# Patient Record
Sex: FEMALE | Race: WHITE | NOT HISPANIC OR LATINO | URBAN - METROPOLITAN AREA
[De-identification: names, ages, dates, MRNs, and addresses within clinical notes are randomized per-mention and may not be internally consistent; named-entity substitution may affect disease eponyms.]

---

## 2023-10-07 ENCOUNTER — INPATIENT (INPATIENT)
Facility: HOSPITAL | Age: 88
LOS: 8 days | Discharge: HOME | DRG: 644 | End: 2023-10-16
Attending: STUDENT IN AN ORGANIZED HEALTH CARE EDUCATION/TRAINING PROGRAM | Admitting: STUDENT IN AN ORGANIZED HEALTH CARE EDUCATION/TRAINING PROGRAM
Payer: MEDICARE

## 2023-10-07 VITALS
WEIGHT: 95.02 LBS | DIASTOLIC BLOOD PRESSURE: 70 MMHG | HEART RATE: 51 BPM | RESPIRATION RATE: 18 BRPM | SYSTOLIC BLOOD PRESSURE: 181 MMHG | OXYGEN SATURATION: 97 % | TEMPERATURE: 99 F

## 2023-10-07 DIAGNOSIS — R33.9 RETENTION OF URINE, UNSPECIFIED: ICD-10-CM

## 2023-10-07 DIAGNOSIS — M25.561 PAIN IN RIGHT KNEE: ICD-10-CM

## 2023-10-07 DIAGNOSIS — I10 ESSENTIAL (PRIMARY) HYPERTENSION: ICD-10-CM

## 2023-10-07 DIAGNOSIS — E78.5 HYPERLIPIDEMIA, UNSPECIFIED: ICD-10-CM

## 2023-10-07 DIAGNOSIS — R53.1 WEAKNESS: ICD-10-CM

## 2023-10-07 DIAGNOSIS — E03.9 HYPOTHYROIDISM, UNSPECIFIED: ICD-10-CM

## 2023-10-07 DIAGNOSIS — Z29.9 ENCOUNTER FOR PROPHYLACTIC MEASURES, UNSPECIFIED: ICD-10-CM

## 2023-10-07 DIAGNOSIS — Z79.899 OTHER LONG TERM (CURRENT) DRUG THERAPY: ICD-10-CM

## 2023-10-07 DIAGNOSIS — J45.909 UNSPECIFIED ASTHMA, UNCOMPLICATED: ICD-10-CM

## 2023-10-07 LAB
ALBUMIN SERPL ELPH-MCNC: 3.5 G/DL — SIGNIFICANT CHANGE UP (ref 3.3–5)
ALP SERPL-CCNC: 72 U/L — SIGNIFICANT CHANGE UP (ref 40–120)
ALT FLD-CCNC: 24 U/L — SIGNIFICANT CHANGE UP (ref 12–78)
ANION GAP SERPL CALC-SCNC: 8 MMOL/L — SIGNIFICANT CHANGE UP (ref 5–17)
APPEARANCE UR: CLEAR — SIGNIFICANT CHANGE UP
APTT BLD: 34.6 SEC — SIGNIFICANT CHANGE UP (ref 24.5–35.6)
AST SERPL-CCNC: 35 U/L — SIGNIFICANT CHANGE UP (ref 15–37)
BASOPHILS # BLD AUTO: 0.03 K/UL — SIGNIFICANT CHANGE UP (ref 0–0.2)
BASOPHILS NFR BLD AUTO: 0.4 % — SIGNIFICANT CHANGE UP (ref 0–2)
BILIRUB SERPL-MCNC: 0.7 MG/DL — SIGNIFICANT CHANGE UP (ref 0.2–1.2)
BILIRUB UR-MCNC: NEGATIVE — SIGNIFICANT CHANGE UP
BUN SERPL-MCNC: 17 MG/DL — SIGNIFICANT CHANGE UP (ref 7–23)
CALCIUM SERPL-MCNC: 9.1 MG/DL — SIGNIFICANT CHANGE UP (ref 8.5–10.1)
CHLORIDE SERPL-SCNC: 107 MMOL/L — SIGNIFICANT CHANGE UP (ref 96–108)
CO2 SERPL-SCNC: 24 MMOL/L — SIGNIFICANT CHANGE UP (ref 22–31)
COLOR SPEC: YELLOW — SIGNIFICANT CHANGE UP
CREAT SERPL-MCNC: 1.2 MG/DL — SIGNIFICANT CHANGE UP (ref 0.5–1.3)
DIFF PNL FLD: NEGATIVE — SIGNIFICANT CHANGE UP
EGFR: 41 ML/MIN/1.73M2 — LOW
EOSINOPHIL # BLD AUTO: 0.37 K/UL — SIGNIFICANT CHANGE UP (ref 0–0.5)
EOSINOPHIL NFR BLD AUTO: 4.7 % — SIGNIFICANT CHANGE UP (ref 0–6)
FLUAV AG NPH QL: SIGNIFICANT CHANGE UP
FLUBV AG NPH QL: SIGNIFICANT CHANGE UP
GLUCOSE SERPL-MCNC: 108 MG/DL — HIGH (ref 70–99)
GLUCOSE UR QL: NEGATIVE MG/DL — SIGNIFICANT CHANGE UP
HCT VFR BLD CALC: 37.8 % — SIGNIFICANT CHANGE UP (ref 34.5–45)
HGB BLD-MCNC: 12.8 G/DL — SIGNIFICANT CHANGE UP (ref 11.5–15.5)
IMM GRANULOCYTES NFR BLD AUTO: 0.4 % — SIGNIFICANT CHANGE UP (ref 0–0.9)
INR BLD: 1.11 RATIO — SIGNIFICANT CHANGE UP (ref 0.85–1.18)
KETONES UR-MCNC: NEGATIVE MG/DL — SIGNIFICANT CHANGE UP
LACTATE SERPL-SCNC: 0.9 MMOL/L — SIGNIFICANT CHANGE UP (ref 0.7–2)
LEUKOCYTE ESTERASE UR-ACNC: NEGATIVE — SIGNIFICANT CHANGE UP
LYMPHOCYTES # BLD AUTO: 0.79 K/UL — LOW (ref 1–3.3)
LYMPHOCYTES # BLD AUTO: 10 % — LOW (ref 13–44)
MAGNESIUM SERPL-MCNC: 2.5 MG/DL — SIGNIFICANT CHANGE UP (ref 1.6–2.6)
MCHC RBC-ENTMCNC: 33 PG — SIGNIFICANT CHANGE UP (ref 27–34)
MCHC RBC-ENTMCNC: 33.9 GM/DL — SIGNIFICANT CHANGE UP (ref 32–36)
MCV RBC AUTO: 97.4 FL — SIGNIFICANT CHANGE UP (ref 80–100)
MONOCYTES # BLD AUTO: 0.63 K/UL — SIGNIFICANT CHANGE UP (ref 0–0.9)
MONOCYTES NFR BLD AUTO: 8 % — SIGNIFICANT CHANGE UP (ref 2–14)
NEUTROPHILS # BLD AUTO: 6.07 K/UL — SIGNIFICANT CHANGE UP (ref 1.8–7.4)
NEUTROPHILS NFR BLD AUTO: 76.5 % — SIGNIFICANT CHANGE UP (ref 43–77)
NITRITE UR-MCNC: NEGATIVE — SIGNIFICANT CHANGE UP
NRBC # BLD: 0 /100 WBCS — SIGNIFICANT CHANGE UP (ref 0–0)
PH UR: 6.5 — SIGNIFICANT CHANGE UP (ref 5–8)
PLATELET # BLD AUTO: 224 K/UL — SIGNIFICANT CHANGE UP (ref 150–400)
POTASSIUM SERPL-MCNC: 4.1 MMOL/L — SIGNIFICANT CHANGE UP (ref 3.5–5.3)
POTASSIUM SERPL-SCNC: 4.1 MMOL/L — SIGNIFICANT CHANGE UP (ref 3.5–5.3)
PROT SERPL-MCNC: 7.4 G/DL — SIGNIFICANT CHANGE UP (ref 6–8.3)
PROT UR-MCNC: SIGNIFICANT CHANGE UP MG/DL
PROTHROM AB SERPL-ACNC: 13 SEC — SIGNIFICANT CHANGE UP (ref 9.5–13)
RBC # BLD: 3.88 M/UL — SIGNIFICANT CHANGE UP (ref 3.8–5.2)
RBC # FLD: 14.5 % — SIGNIFICANT CHANGE UP (ref 10.3–14.5)
RSV RNA NPH QL NAA+NON-PROBE: SIGNIFICANT CHANGE UP
SARS-COV-2 RNA SPEC QL NAA+PROBE: SIGNIFICANT CHANGE UP
SODIUM SERPL-SCNC: 139 MMOL/L — SIGNIFICANT CHANGE UP (ref 135–145)
SP GR SPEC: 1.01 — SIGNIFICANT CHANGE UP (ref 1–1.03)
TROPONIN I, HIGH SENSITIVITY RESULT: 6.5 NG/L — SIGNIFICANT CHANGE UP
TSH SERPL-MCNC: 129 UIU/ML — HIGH (ref 0.36–3.74)
UROBILINOGEN FLD QL: 0.2 MG/DL — SIGNIFICANT CHANGE UP (ref 0.2–1)
WBC # BLD: 7.92 K/UL — SIGNIFICANT CHANGE UP (ref 3.8–10.5)
WBC # FLD AUTO: 7.92 K/UL — SIGNIFICANT CHANGE UP (ref 3.8–10.5)

## 2023-10-07 PROCEDURE — 70450 CT HEAD/BRAIN W/O DYE: CPT | Mod: 26,MA

## 2023-10-07 PROCEDURE — 99285 EMERGENCY DEPT VISIT HI MDM: CPT | Mod: FS

## 2023-10-07 PROCEDURE — 71250 CT THORAX DX C-: CPT | Mod: 26,MA

## 2023-10-07 PROCEDURE — 93010 ELECTROCARDIOGRAM REPORT: CPT

## 2023-10-07 PROCEDURE — 74176 CT ABD & PELVIS W/O CONTRAST: CPT | Mod: 26,MA

## 2023-10-07 PROCEDURE — 71045 X-RAY EXAM CHEST 1 VIEW: CPT | Mod: 26

## 2023-10-07 PROCEDURE — 99223 1ST HOSP IP/OBS HIGH 75: CPT | Mod: GC

## 2023-10-07 RX ORDER — LEVOTHYROXINE SODIUM 125 MCG
88 TABLET ORAL DAILY
Refills: 0 | Status: DISCONTINUED | OUTPATIENT
Start: 2023-10-07 | End: 2023-10-16

## 2023-10-07 RX ORDER — ATORVASTATIN CALCIUM 80 MG/1
20 TABLET, FILM COATED ORAL AT BEDTIME
Refills: 0 | Status: ACTIVE | OUTPATIENT
Start: 2023-10-07 | End: 2024-09-04

## 2023-10-07 RX ORDER — LANOLIN ALCOHOL/MO/W.PET/CERES
3 CREAM (GRAM) TOPICAL AT BEDTIME
Refills: 0 | Status: DISCONTINUED | OUTPATIENT
Start: 2023-10-07 | End: 2023-10-16

## 2023-10-07 RX ORDER — CEFTRIAXONE 500 MG/1
1000 INJECTION, POWDER, FOR SOLUTION INTRAMUSCULAR; INTRAVENOUS ONCE
Refills: 0 | Status: DISCONTINUED | OUTPATIENT
Start: 2023-10-07 | End: 2023-10-07

## 2023-10-07 RX ORDER — ACETAMINOPHEN 500 MG
650 TABLET ORAL ONCE
Refills: 0 | Status: COMPLETED | OUTPATIENT
Start: 2023-10-07 | End: 2023-10-07

## 2023-10-07 RX ORDER — SENNA PLUS 8.6 MG/1
2 TABLET ORAL AT BEDTIME
Refills: 0 | Status: DISCONTINUED | OUTPATIENT
Start: 2023-10-07 | End: 2023-10-16

## 2023-10-07 RX ORDER — AMLODIPINE BESYLATE 2.5 MG/1
5 TABLET ORAL DAILY
Refills: 0 | Status: DISCONTINUED | OUTPATIENT
Start: 2023-10-07 | End: 2023-10-11

## 2023-10-07 RX ORDER — ZINC OXIDE 200 MG/G
1 OINTMENT TOPICAL
Refills: 0 | Status: DISCONTINUED | OUTPATIENT
Start: 2023-10-07 | End: 2023-10-16

## 2023-10-07 RX ORDER — ACETAMINOPHEN 500 MG
650 TABLET ORAL EVERY 6 HOURS
Refills: 0 | Status: DISCONTINUED | OUTPATIENT
Start: 2023-10-07 | End: 2023-10-09

## 2023-10-07 RX ORDER — SODIUM CHLORIDE 9 MG/ML
1000 INJECTION INTRAMUSCULAR; INTRAVENOUS; SUBCUTANEOUS ONCE
Refills: 0 | Status: COMPLETED | OUTPATIENT
Start: 2023-10-07 | End: 2023-10-07

## 2023-10-07 RX ORDER — LEVOTHYROXINE SODIUM 125 MCG
88 TABLET ORAL ONCE
Refills: 0 | Status: COMPLETED | OUTPATIENT
Start: 2023-10-07 | End: 2023-10-07

## 2023-10-07 RX ORDER — CARVEDILOL PHOSPHATE 80 MG/1
6.25 CAPSULE, EXTENDED RELEASE ORAL EVERY 12 HOURS
Refills: 0 | Status: DISCONTINUED | OUTPATIENT
Start: 2023-10-07 | End: 2023-10-11

## 2023-10-07 RX ORDER — POLYETHYLENE GLYCOL 3350 17 G/17G
17 POWDER, FOR SOLUTION ORAL
Refills: 0 | Status: DISCONTINUED | OUTPATIENT
Start: 2023-10-07 | End: 2023-10-16

## 2023-10-07 RX ADMIN — SODIUM CHLORIDE 1000 MILLILITER(S): 9 INJECTION INTRAMUSCULAR; INTRAVENOUS; SUBCUTANEOUS at 19:15

## 2023-10-07 RX ADMIN — SODIUM CHLORIDE 1000 MILLILITER(S): 9 INJECTION INTRAMUSCULAR; INTRAVENOUS; SUBCUTANEOUS at 17:45

## 2023-10-07 RX ADMIN — Medication 650 MILLIGRAM(S): at 23:56

## 2023-10-07 RX ADMIN — Medication 650 MILLIGRAM(S): at 22:24

## 2023-10-07 RX ADMIN — Medication 88 MICROGRAM(S): at 22:05

## 2023-10-07 NOTE — H&P ADULT - PROBLEM SELECTOR PLAN 2
TSH on admission 129  Patient usually takes levothyroxine 88 mcg but patient's niece reports patient hasn't taken it since Wesley 10/1  s/p IVP levothyroxine 88 mcg x1 in ED  - T3 and T4 pending, f/u results  - cont. home levothyroxine  - endocrine (Dr. Perlman) consulted TSH on admission 129  Patient usually takes levothyroxine 88 mcg but patient's niece reports patient hasn't taken it since [place] 10/1 or prior   s/p IVP levothyroxine 88 mcg x1 in ED  - T3 and T4 pending, f/u results  - will restart. home levothyroxine  - endocrine (Dr. Perlman) consulted

## 2023-10-07 NOTE — H&P ADULT - PROBLEM SELECTOR PLAN 1
- on admission  - constipation and urinary retention  - s/p 1L NS,  chase placement in ED  - endocrine (Dr. Perlman) consulted - on admission  - likely in the setting of uncontrolled hypothyroidism  - constipation and urinary retention  - s/p 1L NS,  chase placement in ED  - endocrine (Dr. Perlman) consulted - likely in the setting of uncontrolled hypothyroidism  - on admission  - constipation and urinary retention  - s/p 1L NS,  chase placement in ED  - endocrine (Dr. Perlman) consulted

## 2023-10-07 NOTE — H&P ADULT - NSHPREVIEWOFSYSTEMS_GEN_ALL_CORE
CONSTITUTIONAL: +weakness +fatigue +decreased appetite. Denies fever, chills.  HEENT: denies blurred vision, sore throat  SKIN: +vulvar redness  CARDIOVASCULAR: denies chest pain, chest pressure, palpitations  RESPIRATORY: denies shortness of breath, cough, sputum production  GASTROINTESTINAL: +constipation. Denies nausea, vomiting, diarrhea, abdominal pain, melena or hematochezia   GENITOURINARY: denies dysuria, discharge. +chronic incontinence  NEUROLOGICAL: denies numbness, headache, focal weakness  MUSCULOSKELETAL: denies new joint pain, muscle aches +chronic knee pain  HEMATOLOGIC: denies gross bleeding, bruising CONSTITUTIONAL: +weakness +fatigue +decreased appetite. Denies fever, chills.  HEENT: denies blurred vision, sore throat  SKIN: +vulvovaginal redness  CARDIOVASCULAR: denies chest pain, chest pressure, palpitations  RESPIRATORY: denies shortness of breath, cough, sputum production  GASTROINTESTINAL: +constipation. Denies nausea, vomiting, diarrhea, abdominal pain, melena or hematochezia   GENITOURINARY: denies dysuria, discharge. +chronic incontinence  NEUROLOGICAL: denies numbness, headache, focal weakness  MUSCULOSKELETAL: denies new joint pain, muscle aches +chronic knee pain  HEMATOLOGIC: denies gross bleeding, bruising

## 2023-10-07 NOTE — H&P ADULT - PROBLEM SELECTOR PLAN 5
chronic  - cont. home amlodipine (hold for SBP <110)  - cont. home carvedilol (hold for SBP <110 or HR <60)

## 2023-10-07 NOTE — ED PROVIDER NOTE - CADM POA CENTRAL LINE
Procedure(s): ENDOSCOPIC ULTRASOUND. total IV anesthesia Anesthesia Post Evaluation Patient location during evaluation: bedside Patient participation: complete - patient participated Level of consciousness: responsive to verbal stimuli Pain management: satisfactory to patient Airway patency: patent Anesthetic complications: no 
Cardiovascular status: hemodynamically stable Respiratory status: spontaneous ventilation Hydration status: stable Vitals Value Taken Time /63 6/19/2019  2:47 PM  
Temp 36.1 °C (97 °F) 6/19/2019  2:18 PM  
Pulse 89 6/19/2019  2:47 PM  
Resp 14 6/19/2019  2:47 PM  
SpO2 93 % 6/19/2019  2:47 PM  
 
 No

## 2023-10-07 NOTE — ED PROVIDER NOTE - DIFFERENTIAL DIAGNOSIS
Patient presenting to the emergency room with altered mental status.  Differential is broad includes infectious etiology versus neurologic etiology versus possible reaction to not taking her medications including worsening of her hypothyroidism.  Will obtain screening septic work-up TSH free T3-T4 cardiac enzymes check UA urine culture obtain viral panel obtain CT imaging of the head chest abdomen pelvis EKG and monitor.  Patient will likely require admission for further work-up and management.  Even if this was the result of his CVA patient would not be a tenecteplase or interventional candidate as her symptoms have been ongoing for 1 to 2 weeks and therefore she would clearly be outside the window for any intervention. Differential Diagnosis

## 2023-10-07 NOTE — H&P ADULT - HISTORY OF PRESENT ILLNESS
89yF PMHx of hypothyroidism, osteoarthritis, HLD, and HTN presenting with weakness since Sunday, Patient and patient's niece state the patient has been experiencing weakness, fatigue, confusion, and decreased appetite which has been progressing since Sunday. The patient also had difficulty standing and walking and was receiving help from a friend/neighbor. In addition, the patient had not taken most of her medications since Sunday, including levothyroxine. The only medication the patient consistently took since Sunday was carvedilol. Patient denies fever, chills, or dysuria. Patient's niece endorses that the patient had a few episodes of confusion and being "spacey" for the past few days. Patient's niece also states that she noticed vulvar/vaginal redness when changing the patient's diaper. Patient endorses chronic incontinence and knee pain. Patient also endorses constipation. Patient denies abdominal pain, nausea, vomiting, chest pain, and shortness of breath.    Of note, patient is hard of hearing.    IN THE ED  Vitals: /70 | HR 51  | RR 18  | Temp 98.8F | SpO2 97% on RA   S/p: 1L NS, IVP levothyroxine 88 MCG x1, chase placement  Labs: WBC 7.92| | eGFR 41 | Cr 1.2 BUN 17| UA negative for nitrite and leukocyte esterase  Imaging  < from: CT Abdomen and Pelvis No Cont (10.07.23 @ 18:11) >  IMPRESSION:    Moderate volume of stool distending the rectum, with rectal wall   thickening and perirectal fat infiltration, constellation of findings   suggestive of stercoral colitis.  Distended urinary bladder, may represent urinary retention.  Cholelithiasis.    < end of copied text >    < from: CT Head No Cont (10.07.23 @ 18:07) >  IMPRESSION:   Mild to moderate periventricular and deep white matter   ischemia. 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    Enlarged empty sella.  Opacification of the LEFT sphenoid   sinus with mucosal thickening consistent with chronic sinusitis.    < end of copied text >   89yF PMHx of hypothyroidism, osteoarthritis, HLD, and HTN presenting with weakness since Sunday, Patient and patient's niece state the patient has been experiencing weakness, fatigue, confusion, and decreased appetite which has been progressing since Sunday. The patient also had difficulty standing and walking and was receiving help from a friend/neighbor. In addition, the patient had not taken most of her medications since Sunday, including levothyroxine. The only medication the patient consistently took since Sunday was carvedilol. Patient denies fever, chills, or dysuria. Patient's niece endorses that the patient had a few episodes of confusion and being "spacey" for the past few days. Patient's niece also states that she noticed vulvar/vaginal redness when changing the patient's diaper. Patient endorses chronic incontinence and knee pain. Patient also endorses constipation. Patient denies abdominal pain, nausea, vomiting, chest pain, and shortness of breath.  Of note, patient is hard of hearing.    IN THE ED  Vitals: /70 | HR 51  | RR 18  | Temp 98.8F | SpO2 97% on RA   S/p: 1L NS, IVP levothyroxine 88 MCG x1, chase placement  Labs: WBC 7.92| | eGFR 41 | Cr 1.2 BUN 17| UA negative for nitrite and leukocyte esterase  Imaging  < from: CT Abdomen and Pelvis No Cont (10.07.23 @ 18:11) >  IMPRESSION:    Moderate volume of stool distending the rectum, with rectal wall   thickening and perirectal fat infiltration, constellation of findings   suggestive of stercoral colitis.  Distended urinary bladder, may represent urinary retention.  Cholelithiasis.    < end of copied text >    < from: CT Head No Cont (10.07.23 @ 18:07) >  IMPRESSION:   Mild to moderate periventricular and deep white matter   ischemia. 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    Enlarged empty sella.  Opacification of the LEFT sphenoid   sinus with mucosal thickening consistent with chronic sinusitis.    < end of copied text >

## 2023-10-07 NOTE — H&P ADULT - ASSESSMENT
89yF PMHx of hypothyroidism, osteoarthritis, HLD, and HTN presenting with weakness since Sunday, Patient and patient's niece state the patient has been experiencing weakness, fatigue, confusion, and decreased appetite which has been progressing since Sunday. The patient also had difficulty standing and walking and was receiving help from a friend/neighbor. In addition, the patient had not taken most of her medications since Sunday, including levothyroxine. The only medication the patient consistently took since Sunday was carvedilol. Patient denies fever, chills, or dysuria. Patient's niece endorses that the patient had a few episodes of confusion and being "spacey" for the past few days. Patient's niece also states that she noticed vulvar/vaginal redness when changing the patient's diaper. Patient endorses chronic incontinence and knee pain. Patient also endorses constipation. Patient denies abdominal pain, nausea, vomiting, chest pain, and shortness of breath.    Of note, patient is hard of hearing.    IN THE ED  Vitals: /70 | HR 51  | RR 18  | Temp 98.8F | SpO2 97% on RA   S/p: , rocephin x1  Labs: WBC 7.92| | eGFR 41 | Cr 1.2 BUN 17| UA negative for nitrite and leukocyte esterase  Imaging  < from: CT Abdomen and Pelvis No Cont (10.07.23 @ 18:11) >  IMPRESSION:    Moderate volume of stool distending the rectum, with rectal wall   thickening and perirectal fat infiltration, constellation of findings   suggestive of stercoral colitis.  Distended urinary bladder, may represent urinary retention.  Cholelithiasis.    < end of copied text >    < from: CT Head No Cont (10.07.23 @ 18:07) >  IMPRESSION:   Mild to moderate periventricular and deep white matter   ischemia. 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    Enlarged empty sella.  Opacification of the LEFT sphenoid   sinus with mucosal thickening consistent with chronic sinusitis.    < end of copied text >   89yF PMHx of hypothyroidism, osteoarthritis, HLD, and HTN presenting with weakness, fatigue, confusion, and decreased appetite which has been progressing since Sunday. Admitted for weakness, urinary retention, and hypothyroidism ( on admission).

## 2023-10-07 NOTE — ED ADULT NURSE NOTE - OBJECTIVE STATEMENT
89y/o female A&ox1, ambulatory at baseline, received in rm3a. as per family, pt has been unable to walk, weakness/decreased responsiveness since sunday. pt denies complaints, pain. pt awake, responding to verbal stimuli, respirations even and unlabored. sinus dominga 40s-50s on cardiac monitor. satting 96% on RA. 20g iv placed in LAC, labs sent. md at bedside for eval. bed in lowest position, side rails up, call bell in hand, safety maintained. awaiting further orders. will continue to monitor.

## 2023-10-07 NOTE — H&P ADULT - NSHPPHYSICALEXAM_GEN_ALL_CORE
T(C): 37 (10-07-23 @ 20:15), Max: 37.1 (10-07-23 @ 16:40)  HR: 45 (10-07-23 @ 20:15) (45 - 51)  BP: 177/74 (10-07-23 @ 20:15) (177/74 - 181/70)  RR: 16 (10-07-23 @ 20:15) (16 - 18)  SpO2: 97% (10-07-23 @ 20:15) (97% - 97%)    GENERAL: no acute distress, appropriately interactive, chase in place  HEENMT: sclera clear, moist mucous membranes +hard of hearing  NECK: supple, soft  LUNGS: good air entry bilaterally, clear to auscultation, symmetric breath sounds, no wheezing or rhonchi appreciated  HEART: soft S1/S2, regular rate and rhythm, no murmurs noted, no lower extremity edema  GASTROINTESTINAL: abdomen is soft, nontender, nondistended, normoactive bowel sounds  INTEGUMENT: good skin turgor, warm skin, appears well perfused  MUSCULOSKELETAL: no clubbing or cyanosis, +b/l knee edema R>L, nontender to palpation  NEUROLOGIC: interacts, follows commands, and answers questions appropriately  HEME/LYMPH: no obvious ecchymosis or petechiae T(C): 37 (10-07-23 @ 20:15), Max: 37.1 (10-07-23 @ 16:40)  HR: 45 (10-07-23 @ 20:15) (45 - 51)  BP: 177/74 (10-07-23 @ 20:15) (177/74 - 181/70)  RR: 16 (10-07-23 @ 20:15) (16 - 18)  SpO2: 97% (10-07-23 @ 20:15) (97% - 97%)  GENERAL: no acute distress, appropriately interactive, chase in place  HEENMT: sclera clear, moist mucous membranes +hard of hearing  NECK: supple, soft  LUNGS: good air entry bilaterally, clear to auscultation, symmetric breath sounds, no wheezing or rhonchi appreciated  HEART: soft S1/S2, regular rate and rhythm, no murmurs noted, no lower extremity edema  GASTROINTESTINAL: abdomen is soft, nontender, nondistended, normoactive bowel sounds  INTEGUMENT: good skin turgor, warm skin, appears well perfused  MUSCULOSKELETAL: no clubbing or cyanosis, +b/l knee edema R>L, nontender to palpation  NEUROLOGIC: interacts, follows commands, and answers questions appropriately  HEME/LYMPH: no obvious ecchymosis or petechiae

## 2023-10-07 NOTE — ED PROVIDER NOTE - CONSTITUTIONAL, MLM
Well appearing, awake, alert, oriented to person, not place, time/situation and in no apparent distress Kashia normal...

## 2023-10-07 NOTE — H&P ADULT - PROBLEM SELECTOR PLAN 8
Chronic knee pain likely secondary to OA  - bilateral knee edema (R>L) on exam  - 650mg tylenol x1  - PRN tylenol ordered for pain  - f/u bilateral knee x-ray Chronic knee pain likely secondary to OA  - bilateral knee edema (R>L) on exam  - 650mg tylenol x1  - PRN tylenol ordered for pain  - bilateral knee x-ray ordered. Patient currently refusing

## 2023-10-07 NOTE — H&P ADULT - PROBLEM SELECTOR PLAN 9
DVT ppx: subQ heparin DVT ppx: subQ heparin q8 - DVT ppx: subQ heparin q8  - Patient and niece interested in learning more about GOC but not interested in filling out a MOLST form at time of admission. Palliative consulted for GOC discussion

## 2023-10-07 NOTE — ED PROVIDER NOTE - CLINICAL SUMMARY MEDICAL DECISION MAKING FREE TEXT BOX
Patient is a 89-year-old female who presents to the emergency room with a chief complaint of weakness past medical history of hypertension hyperlipidemia hypothyroidism hearing loss asthma.  Presents to the emergency room with niece for generalized weakness and decline for about a week.  Niece reports that she had lived alone and was usually independent.  2 weeks ago family noted decline.  Patient's neighbor has been helping out feeding her but family decided to move the patient in with them.  Patient is not eating on her own and is not speaking and is unable to walk due to generalized weakness.  It is unclear if patient has been taking her medications but is currently refusing them now.  Family denies any known trauma reports today patient was just moaning and not speaking.  On exam patient is lying in bed awake not answering questions.  Normocephalic atraumatic pupils equal round and reactive heart regular bradycardic lungs clear to auscultation abdomen soft with diffuse tenderness to palpation what appears to be suprapubic fullness.  Patient presenting to the emergency room with altered mental status.  Differential is broad includes infectious etiology versus neurologic etiology versus possible reaction to not taking her medications including worsening of her hypothyroidism.  Will obtain screening septic work-up TSH free T3-T4 cardiac enzymes check UA urine culture obtain viral panel obtain CT imaging of the head chest abdomen pelvis EKG and monitor.  Patient will likely require admission for further work-up and management.  Even if this was the result of his CVA patient would not be a tenecteplase or interventional candidate as her symptoms have been ongoing for 1 to 2 weeks and therefore she would clearly be outside the window for any intervention.  Results of labs reviewed patient with a white count of 7 no significant electrolyte abnormality and TSH of 129 raising concern for uncontrolled hypothyroidism resulting in patient's bradycardia IV Synthroid was ordered.  Viral panel negative.  Independent review of chest x-ray reveals no acute infiltrate.  CT imaging reveals mild to moderate periventricular deep white matter ischemia old lacunar infarcts are seen.  Moderate volume of stool distending the rectum with rectal wall thickening suggestive of stercoral colitis.  Distended urinary bladder.  Cholelithiasis.  Urinary retention may be secondary to the uncontrolled hypothyroidism Krishna catheter placed urine sent no evidence of overt infection.  Patient will require admission for further work-up and management. Independent review of EKG reveals a sinus bradycardia with PVCs left axis deviation right bundle branch block at a rate of 49 beats per min

## 2023-10-07 NOTE — H&P ADULT - ATTENDING COMMENTS
89yF PMHx of hypothyroidism, osteoarthritis, HLD, and HTN presenting with weakness, fatigue, confusion, and decreased appetite which has been progressing since Sunday. Admitted for weakness, urinary retention, and hypothyroidism ( on admission).    Agree with above. Edited where appropriate

## 2023-10-07 NOTE — H&P ADULT - PROBLEM SELECTOR PLAN 3
CT A/P: Moderate volume of stool distending the rectum and distended urinary bladder  - UA negative for nitrite and leukocyte esterase  - urinary retention may be secondary to constipation  - desitin ordered for vulvovaginal redness  - chase placed CT A/P: Moderate volume of stool distending the rectum and distended urinary bladder  - UA negative for nitrite and leukocyte esterase  - urinary retention may be secondary to constipation  - dulcolax suppository x1  - bowel regimen (miralax and senna)  - desitin ordered for vulvovaginal redness  - chase placed

## 2023-10-07 NOTE — ED PROVIDER NOTE - PROGRESS NOTE DETAILS
tsh abnormal t3 t4 added on given synthroid ct scan + stool and distended bladder chase ordered   will admit for weakness possible thyroid related may need rehab/nursing placement

## 2023-10-07 NOTE — H&P ADULT - NSHPSOCIALHISTORY_GEN_ALL_CORE
Lives: alone in a condo  ADLs: handles most but needs some physical assistance. Ambulates with a cane  Diet: no shellfish (dental bridge recently broke so currently eating soups and puree)  Alcohol Use: none  Tobacco Use: never  Recreational Drug Use: none

## 2023-10-07 NOTE — H&P ADULT - PROBLEM SELECTOR PLAN 6
chronic  - patient uses albuterol, Advair, and Singulair but doses unknown  - primary team to clarify medication reconciliation

## 2023-10-08 DIAGNOSIS — Z98.890 OTHER SPECIFIED POSTPROCEDURAL STATES: Chronic | ICD-10-CM

## 2023-10-08 LAB
ALBUMIN SERPL ELPH-MCNC: 3.2 G/DL — LOW (ref 3.3–5)
ALP SERPL-CCNC: 66 U/L — SIGNIFICANT CHANGE UP (ref 40–120)
ALT FLD-CCNC: 23 U/L — SIGNIFICANT CHANGE UP (ref 12–78)
ANION GAP SERPL CALC-SCNC: 7 MMOL/L — SIGNIFICANT CHANGE UP (ref 5–17)
AST SERPL-CCNC: 31 U/L — SIGNIFICANT CHANGE UP (ref 15–37)
BASOPHILS # BLD AUTO: 0.03 K/UL — SIGNIFICANT CHANGE UP (ref 0–0.2)
BASOPHILS NFR BLD AUTO: 0.5 % — SIGNIFICANT CHANGE UP (ref 0–2)
BILIRUB SERPL-MCNC: 0.6 MG/DL — SIGNIFICANT CHANGE UP (ref 0.2–1.2)
BUN SERPL-MCNC: 15 MG/DL — SIGNIFICANT CHANGE UP (ref 7–23)
CALCIUM SERPL-MCNC: 8.7 MG/DL — SIGNIFICANT CHANGE UP (ref 8.5–10.1)
CHLORIDE SERPL-SCNC: 111 MMOL/L — HIGH (ref 96–108)
CO2 SERPL-SCNC: 25 MMOL/L — SIGNIFICANT CHANGE UP (ref 22–31)
CREAT SERPL-MCNC: 1.1 MG/DL — SIGNIFICANT CHANGE UP (ref 0.5–1.3)
EGFR: 45 ML/MIN/1.73M2 — LOW
EOSINOPHIL # BLD AUTO: 0.42 K/UL — SIGNIFICANT CHANGE UP (ref 0–0.5)
EOSINOPHIL NFR BLD AUTO: 6.6 % — HIGH (ref 0–6)
GLUCOSE SERPL-MCNC: 91 MG/DL — SIGNIFICANT CHANGE UP (ref 70–99)
HCT VFR BLD CALC: 33.5 % — LOW (ref 34.5–45)
HGB BLD-MCNC: 11.5 G/DL — SIGNIFICANT CHANGE UP (ref 11.5–15.5)
IMM GRANULOCYTES NFR BLD AUTO: 0.3 % — SIGNIFICANT CHANGE UP (ref 0–0.9)
LYMPHOCYTES # BLD AUTO: 0.86 K/UL — LOW (ref 1–3.3)
LYMPHOCYTES # BLD AUTO: 13.5 % — SIGNIFICANT CHANGE UP (ref 13–44)
MCHC RBC-ENTMCNC: 33 PG — SIGNIFICANT CHANGE UP (ref 27–34)
MCHC RBC-ENTMCNC: 34.3 GM/DL — SIGNIFICANT CHANGE UP (ref 32–36)
MCV RBC AUTO: 96.3 FL — SIGNIFICANT CHANGE UP (ref 80–100)
MONOCYTES # BLD AUTO: 0.54 K/UL — SIGNIFICANT CHANGE UP (ref 0–0.9)
MONOCYTES NFR BLD AUTO: 8.5 % — SIGNIFICANT CHANGE UP (ref 2–14)
NEUTROPHILS # BLD AUTO: 4.5 K/UL — SIGNIFICANT CHANGE UP (ref 1.8–7.4)
NEUTROPHILS NFR BLD AUTO: 70.6 % — SIGNIFICANT CHANGE UP (ref 43–77)
NRBC # BLD: 0 /100 WBCS — SIGNIFICANT CHANGE UP (ref 0–0)
PLATELET # BLD AUTO: 185 K/UL — SIGNIFICANT CHANGE UP (ref 150–400)
POTASSIUM SERPL-MCNC: 3.9 MMOL/L — SIGNIFICANT CHANGE UP (ref 3.5–5.3)
POTASSIUM SERPL-SCNC: 3.9 MMOL/L — SIGNIFICANT CHANGE UP (ref 3.5–5.3)
PROT SERPL-MCNC: 6.7 G/DL — SIGNIFICANT CHANGE UP (ref 6–8.3)
RBC # BLD: 3.48 M/UL — LOW (ref 3.8–5.2)
RBC # FLD: 14.4 % — SIGNIFICANT CHANGE UP (ref 10.3–14.5)
SODIUM SERPL-SCNC: 143 MMOL/L — SIGNIFICANT CHANGE UP (ref 135–145)
T3 SERPL-MCNC: <20 NG/DL — LOW (ref 80–200)
T4 AB SER-ACNC: 0.4 UG/DL — LOW (ref 4.6–12)
WBC # BLD: 6.37 K/UL — SIGNIFICANT CHANGE UP (ref 3.8–10.5)
WBC # FLD AUTO: 6.37 K/UL — SIGNIFICANT CHANGE UP (ref 3.8–10.5)

## 2023-10-08 PROCEDURE — 99232 SBSQ HOSP IP/OBS MODERATE 35: CPT

## 2023-10-08 RX ORDER — HEPARIN SODIUM 5000 [USP'U]/ML
5000 INJECTION INTRAVENOUS; SUBCUTANEOUS EVERY 8 HOURS
Refills: 0 | Status: DISCONTINUED | OUTPATIENT
Start: 2023-10-08 | End: 2023-10-16

## 2023-10-08 RX ORDER — INFLUENZA VIRUS VACCINE 15; 15; 15; 15 UG/.5ML; UG/.5ML; UG/.5ML; UG/.5ML
0.7 SUSPENSION INTRAMUSCULAR ONCE
Refills: 0 | Status: DISCONTINUED | OUTPATIENT
Start: 2023-10-08 | End: 2023-10-16

## 2023-10-08 RX ADMIN — ZINC OXIDE 1 APPLICATION(S): 200 OINTMENT TOPICAL at 20:53

## 2023-10-08 RX ADMIN — ATORVASTATIN CALCIUM 20 MILLIGRAM(S): 80 TABLET, FILM COATED ORAL at 21:41

## 2023-10-08 RX ADMIN — HEPARIN SODIUM 5000 UNIT(S): 5000 INJECTION INTRAVENOUS; SUBCUTANEOUS at 21:41

## 2023-10-08 RX ADMIN — SENNA PLUS 2 TABLET(S): 8.6 TABLET ORAL at 21:41

## 2023-10-08 RX ADMIN — Medication 88 MICROGRAM(S): at 06:55

## 2023-10-08 RX ADMIN — Medication 10 MILLIGRAM(S): at 07:02

## 2023-10-08 RX ADMIN — HEPARIN SODIUM 5000 UNIT(S): 5000 INJECTION INTRAVENOUS; SUBCUTANEOUS at 06:55

## 2023-10-08 RX ADMIN — Medication 650 MILLIGRAM(S): at 22:11

## 2023-10-08 RX ADMIN — HEPARIN SODIUM 5000 UNIT(S): 5000 INJECTION INTRAVENOUS; SUBCUTANEOUS at 14:12

## 2023-10-08 RX ADMIN — CARVEDILOL PHOSPHATE 6.25 MILLIGRAM(S): 80 CAPSULE, EXTENDED RELEASE ORAL at 17:19

## 2023-10-08 RX ADMIN — Medication 650 MILLIGRAM(S): at 21:41

## 2023-10-08 RX ADMIN — ZINC OXIDE 1 APPLICATION(S): 200 OINTMENT TOPICAL at 07:33

## 2023-10-08 RX ADMIN — POLYETHYLENE GLYCOL 3350 17 GRAM(S): 17 POWDER, FOR SOLUTION ORAL at 17:19

## 2023-10-08 RX ADMIN — AMLODIPINE BESYLATE 5 MILLIGRAM(S): 2.5 TABLET ORAL at 06:55

## 2023-10-08 RX ADMIN — POLYETHYLENE GLYCOL 3350 17 GRAM(S): 17 POWDER, FOR SOLUTION ORAL at 06:54

## 2023-10-08 NOTE — PROGRESS NOTE ADULT - PROBLEM SELECTOR PLAN 9
- DVT ppx: subQ heparin q8  - Patient and niece interested in learning more about GOC but not interested in filling out a MOLST form at time of admission. Palliative consulted for GOC discussion

## 2023-10-08 NOTE — ED ADULT NURSE REASSESSMENT NOTE - NSFALLHARMRISKINTERV_ED_ALL_ED

## 2023-10-08 NOTE — PROGRESS NOTE ADULT - PROBLEM SELECTOR PLAN 1
- likely in the setting of uncontrolled hypothyroidism  - on admission  - constipation and urinary retention  - s/p 1L NS,  chase placement in ED  - endocrine (Dr. Perlman) consulted  -SLP consulted

## 2023-10-08 NOTE — PROGRESS NOTE ADULT - PROBLEM SELECTOR PLAN 6
chronic  - Continue Albuterol PRN Chronic knee pain likely secondary to OA  - bilateral knee edema (R>L) on exam  - 650mg tylenol x1  - PRN tylenol ordered for pain  - bilateral knee x-ray ordered. Patient currently refusing

## 2023-10-08 NOTE — PATIENT PROFILE ADULT - FALL HARM RISK - HARM RISK INTERVENTIONS

## 2023-10-08 NOTE — PROGRESS NOTE ADULT - PROBLEM SELECTOR PLAN 2
TSH on admission 129  Patient usually takes levothyroxine 88 mcg but patient's niece reports patient hasn't taken it since [place] 10/1 or prior   s/p IVP levothyroxine 88 mcg x1 in ED  - T3 and T4 pending, f/u results  - will restart. home levothyroxine  - endocrine (Dr. Perlman) consulted

## 2023-10-08 NOTE — ED ADULT NURSE REASSESSMENT NOTE - NS ED NURSE REASSESS COMMENT FT1
0715: report received from previous shift RN. pt laying in bed, awake and restful. both hearing aid & dentures were broken at home per daughter at bedside-- pt hard of hearing + puree diet, crushed pills per orders.  pt & daughter deny any new/worsening complaints. pt bradycardic on CM, HR 40's since ED arrival, asymptomatic, received synthroid this AM for hypothyroidism. pt remote telemetry admission for weakness & abnormal TSH... respirations even/unlabored, no acute distress noted. comfort/safety maintained. will continue to monitor.

## 2023-10-08 NOTE — PATIENT PROFILE ADULT - FUNCTIONAL ASSESSMENT - BASIC MOBILITY 6.
2-calculated by average/Not able to assess (calculate score using [facility] averaging method)

## 2023-10-08 NOTE — PROGRESS NOTE ADULT - PROBLEM SELECTOR PLAN 8
Chronic knee pain likely secondary to OA  - bilateral knee edema (R>L) on exam  - 650mg tylenol x1  - PRN tylenol ordered for pain  - bilateral knee x-ray ordered. Patient currently refusing

## 2023-10-08 NOTE — PROGRESS NOTE ADULT - ASSESSMENT
89yF PMHx of hypothyroidism, osteoarthritis, HLD, and HTN presenting with weakness, fatigue, confusion, and decreased appetite which has been progressing since Sunday. Admitted for weakness, urinary retention, and hypothyroidism ( on admission).

## 2023-10-09 LAB
ANION GAP SERPL CALC-SCNC: 7 MMOL/L — SIGNIFICANT CHANGE UP (ref 5–17)
BUN SERPL-MCNC: 16 MG/DL — SIGNIFICANT CHANGE UP (ref 7–23)
CALCIUM SERPL-MCNC: 8.5 MG/DL — SIGNIFICANT CHANGE UP (ref 8.5–10.1)
CHLORIDE SERPL-SCNC: 110 MMOL/L — HIGH (ref 96–108)
CO2 SERPL-SCNC: 25 MMOL/L — SIGNIFICANT CHANGE UP (ref 22–31)
CREAT SERPL-MCNC: 1.1 MG/DL — SIGNIFICANT CHANGE UP (ref 0.5–1.3)
CULTURE RESULTS: NO GROWTH — SIGNIFICANT CHANGE UP
EGFR: 45 ML/MIN/1.73M2 — LOW
GLUCOSE SERPL-MCNC: 89 MG/DL — SIGNIFICANT CHANGE UP (ref 70–99)
HCT VFR BLD CALC: 33.8 % — LOW (ref 34.5–45)
HGB BLD-MCNC: 12.1 G/DL — SIGNIFICANT CHANGE UP (ref 11.5–15.5)
MCHC RBC-ENTMCNC: 33.9 PG — SIGNIFICANT CHANGE UP (ref 27–34)
MCHC RBC-ENTMCNC: 35.8 GM/DL — SIGNIFICANT CHANGE UP (ref 32–36)
MCV RBC AUTO: 94.7 FL — SIGNIFICANT CHANGE UP (ref 80–100)
NRBC # BLD: 0 /100 WBCS — SIGNIFICANT CHANGE UP (ref 0–0)
PLATELET # BLD AUTO: 173 K/UL — SIGNIFICANT CHANGE UP (ref 150–400)
POTASSIUM SERPL-MCNC: 3.5 MMOL/L — SIGNIFICANT CHANGE UP (ref 3.5–5.3)
POTASSIUM SERPL-SCNC: 3.5 MMOL/L — SIGNIFICANT CHANGE UP (ref 3.5–5.3)
RBC # BLD: 3.57 M/UL — LOW (ref 3.8–5.2)
RBC # FLD: 14.1 % — SIGNIFICANT CHANGE UP (ref 10.3–14.5)
SODIUM SERPL-SCNC: 142 MMOL/L — SIGNIFICANT CHANGE UP (ref 135–145)
SPECIMEN SOURCE: SIGNIFICANT CHANGE UP
WBC # BLD: 6.94 K/UL — SIGNIFICANT CHANGE UP (ref 3.8–10.5)
WBC # FLD AUTO: 6.94 K/UL — SIGNIFICANT CHANGE UP (ref 3.8–10.5)

## 2023-10-09 PROCEDURE — 99232 SBSQ HOSP IP/OBS MODERATE 35: CPT

## 2023-10-09 RX ORDER — ACETAMINOPHEN 500 MG
650 TABLET ORAL ONCE
Refills: 0 | Status: COMPLETED | OUTPATIENT
Start: 2023-10-09 | End: 2023-10-09

## 2023-10-09 RX ORDER — IBUPROFEN 200 MG
200 TABLET ORAL ONCE
Refills: 0 | Status: COMPLETED | OUTPATIENT
Start: 2023-10-09 | End: 2023-10-09

## 2023-10-09 RX ORDER — ACETAMINOPHEN 500 MG
650 TABLET ORAL EVERY 6 HOURS
Refills: 0 | Status: DISCONTINUED | OUTPATIENT
Start: 2023-10-10 | End: 2023-10-10

## 2023-10-09 RX ADMIN — AMLODIPINE BESYLATE 5 MILLIGRAM(S): 2.5 TABLET ORAL at 05:35

## 2023-10-09 RX ADMIN — Medication 260 MILLIGRAM(S): at 13:54

## 2023-10-09 RX ADMIN — HEPARIN SODIUM 5000 UNIT(S): 5000 INJECTION INTRAVENOUS; SUBCUTANEOUS at 22:35

## 2023-10-09 RX ADMIN — HEPARIN SODIUM 5000 UNIT(S): 5000 INJECTION INTRAVENOUS; SUBCUTANEOUS at 05:34

## 2023-10-09 RX ADMIN — SENNA PLUS 2 TABLET(S): 8.6 TABLET ORAL at 22:35

## 2023-10-09 RX ADMIN — HEPARIN SODIUM 5000 UNIT(S): 5000 INJECTION INTRAVENOUS; SUBCUTANEOUS at 13:54

## 2023-10-09 RX ADMIN — Medication 650 MILLIGRAM(S): at 23:16

## 2023-10-09 RX ADMIN — ATORVASTATIN CALCIUM 20 MILLIGRAM(S): 80 TABLET, FILM COATED ORAL at 22:35

## 2023-10-09 RX ADMIN — Medication 200 MILLIGRAM(S): at 20:18

## 2023-10-09 RX ADMIN — Medication 650 MILLIGRAM(S): at 14:35

## 2023-10-09 RX ADMIN — POLYETHYLENE GLYCOL 3350 17 GRAM(S): 17 POWDER, FOR SOLUTION ORAL at 05:34

## 2023-10-09 RX ADMIN — POLYETHYLENE GLYCOL 3350 17 GRAM(S): 17 POWDER, FOR SOLUTION ORAL at 18:46

## 2023-10-09 RX ADMIN — CARVEDILOL PHOSPHATE 6.25 MILLIGRAM(S): 80 CAPSULE, EXTENDED RELEASE ORAL at 18:47

## 2023-10-09 RX ADMIN — ZINC OXIDE 1 APPLICATION(S): 200 OINTMENT TOPICAL at 05:34

## 2023-10-09 RX ADMIN — Medication 260 MILLIGRAM(S): at 22:46

## 2023-10-09 RX ADMIN — ZINC OXIDE 1 APPLICATION(S): 200 OINTMENT TOPICAL at 18:46

## 2023-10-09 RX ADMIN — Medication 88 MICROGRAM(S): at 05:35

## 2023-10-09 RX ADMIN — Medication 200 MILLIGRAM(S): at 19:48

## 2023-10-09 NOTE — GOALS OF CARE CONVERSATION - ADVANCED CARE PLANNING - CONVERSATION DETAILS
Writer met with pt daughter Reviewed patient's medical and social history as well as events leading to patient's hospitalization. Writer discussed patient's current diagnosis  medical condition and management,  weakness, impaired mental status, advanced age, prognosis, and life expectancy. Inquired about patient's wishes regarding extent of medical care to be provided including escalation of medical care into the ICU intubation with vent  and use of vasopressor support. In addition, the writer inquired about thoughts regarding cardiopulmonary resuscitation, artificial nutrition and hydration including use of feeding tubes and IVF, antibiotics, and further investigative studies such as blood draws and radiology .pt daughter showed good . insight into medical condition. All questions answered.  Patient daughter consented to DNR/DNI  status. MOLST form filled out and placed in chart.

## 2023-10-09 NOTE — DISCHARGE NOTE PROVIDER - NSDCCPCAREPLAN_GEN_ALL_CORE_FT
PRINCIPAL DISCHARGE DIAGNOSIS  Diagnosis: Weakness  Assessment and Plan of Treatment:       SECONDARY DISCHARGE DIAGNOSES  Diagnosis: Abnormal TSH  Assessment and Plan of Treatment:      PRINCIPAL DISCHARGE DIAGNOSIS  Diagnosis: Weakness  Assessment and Plan of Treatment: You symptoms have improved.      SECONDARY DISCHARGE DIAGNOSES  Diagnosis: Abnormal TSH  Assessment and Plan of Treatment: You were found to have abnormal levels of thyroid function hormone. We started you on Levothyroxine 88 mcg daily. Please take your medication as prescribed. Follow with your primary medical doctor to monitor your thyroid function, repeat thyroid function tests in 4 weeks.     PRINCIPAL DISCHARGE DIAGNOSIS  Diagnosis: Weakness  Assessment and Plan of Treatment: You were admitted with weakness likely related to your thyroid function. We started you on medications for your thyroid funciton. Your symptoms have improved. Please follow with your medical doctor.      SECONDARY DISCHARGE DIAGNOSES  Diagnosis: Abnormal TSH  Assessment and Plan of Treatment: You were found to have abnormal levels of thyroid function hormone. We started you on Levothyroxine 88 mcg daily. Please take your medication as prescribed. Follow with your primary medical doctor to monitor your thyroid function, repeat thyroid function tests in 4 weeks.    Diagnosis: Urinary retention  Assessment and Plan of Treatment: You were seen by urology for urinary retention. You had a urinary catheter placed and disconitnued. You continued to retain urine. We had to place the urinary catheter and you are discharged with catheter and outpatient urology follow up. Plese make an appointment for follow up.    Diagnosis: HTN (hypertension)  Assessment and Plan of Treatment: Continue current medical management.     PRINCIPAL DISCHARGE DIAGNOSIS  Diagnosis: Weakness  Assessment and Plan of Treatment: You were admitted with weakness likely related to your thyroid function. We started you on medications for your thyroid funciton. Your symptoms have improved. Please follow with your medical doctor.      SECONDARY DISCHARGE DIAGNOSES  Diagnosis: Urinary retention  Assessment and Plan of Treatment: You were seen by urology for urinary retention. You had a urinary catheter placed and disconitnued. You continued to retain urine. We had to place the urinary catheter and you are discharged with catheter and outpatient urology follow up. Flush chase catheter with 10 cc normal saline PRN    Diagnosis: Abnormal TSH  Assessment and Plan of Treatment: You were found to have abnormal levels of thyroid function hormone. We started you on Levothyroxine 88 mcg daily. Please take your medication as prescribed. Follow with your primary medical doctor to monitor your thyroid function, repeat thyroid function tests in 4 weeks.    Diagnosis: HTN (hypertension)  Assessment and Plan of Treatment: Continue current medical management.

## 2023-10-09 NOTE — DISCHARGE NOTE PROVIDER - NSDCMRMEDTOKEN_GEN_ALL_CORE_FT
amLODIPine 5 mg oral tablet: 1 tab(s) orally once a day  atorvastatin 20 mg oral tablet: 1 tab(s) orally once a day  carvedilol 6.25 mg oral tablet: 1 tab(s) orally 2 times a day  Synthroid 88 mcg (0.088 mg) oral tablet: 1 tab(s) orally once a day   acetaminophen 325 mg oral tablet: 2 tab(s) orally every 6 hours As needed Moderate Pain (4 - 6)  amLODIPine 10 mg oral tablet: 1 tab(s) orally once a day  atorvastatin 20 mg oral tablet: 1 tab(s) orally once a day  carvedilol 6.25 mg oral tablet: 1 tab(s) orally 2 times a day  ibuprofen 400 mg oral tablet: 1 tab(s) orally every 8 hours As needed Mild Pain (1 - 3)  levothyroxine 88 mcg (0.088 mg) oral tablet: 1 tab(s) orally once a day  polyethylene glycol 3350 oral powder for reconstitution: 17 gram(s) orally 2 times a day  senna leaf extract oral tablet: 2 tab(s) orally once a day (at bedtime)   acetaminophen 325 mg oral tablet: 2 tab(s) orally every 6 hours As needed Moderate Pain (4 - 6)  amLODIPine 10 mg oral tablet: 1 tab(s) orally once a day  bethanechol 25 mg oral tablet: 1 tab(s) orally 2 times a day MDD: 50mg  hydrALAZINE 50 mg oral tablet: 1 tab(s) orally 3 times a day  ibuprofen 400 mg oral tablet: 1 tab(s) orally every 8 hours As needed Mild Pain (1 - 3)  levothyroxine 88 mcg (0.088 mg) oral tablet: 1 tab(s) orally once a day  polyethylene glycol 3350 oral powder for reconstitution: 17 gram(s) orally 2 times a day  senna leaf extract oral tablet: 2 tab(s) orally once a day (at bedtime)  traMADol 50 mg oral tablet: 1 tab(s) orally 3 times a day as needed for Severe Pain (7 - 10) MDD: 150 mg

## 2023-10-09 NOTE — PROGRESS NOTE ADULT - PROBLEM SELECTOR PLAN 1
- likely in the setting of uncontrolled hypothyroidism  - on admission  - constipation and urinary retention  - s/p 1L NS,  chase placement in ED  - endocrine (Dr. Perlman) consulted, recommendartions appreciated   -SLP consulted  -PT evaluation - daughter would like patient to go home   -Palliative care consulted - interested in DNR/DNI, limited interventions

## 2023-10-09 NOTE — CARE COORDINATION ASSESSMENT. - NSPASTMEDSURGHISTORY_GEN_ALL_CORE_FT
PAST MEDICAL & SURGICAL HISTORY:  Asthma      HTN (hypertension)      HLD (hyperlipidemia)      Hypothyroid      H/O removal of cyst

## 2023-10-09 NOTE — DISCHARGE NOTE PROVIDER - CARE PROVIDER_API CALL
MELECIO SMALL  Phone: [phone]  Fax: [phone]  Follow Up Time:     ARI BARBA  Follow Up Time:

## 2023-10-09 NOTE — PROGRESS NOTE ADULT - PROBLEM SELECTOR PLAN 6
Chronic knee pain likely secondary to OA  - bilateral knee edema (R>L) on exam  -Switch Tylenol to standing. Dose of IV now and at 9pm  - bilateral knee x-ray ordered. Daughter declining would like limited medical intervention

## 2023-10-09 NOTE — DISCHARGE NOTE PROVIDER - HOSPITAL COURSE
HPI:  89yF PMHx of hypothyroidism, osteoarthritis, HLD, and HTN presenting with weakness since Sunday, Patient and patient's niece state the patient has been experiencing weakness, fatigue, confusion, and decreased appetite which has been progressing since Sunday. The patient also had difficulty standing and walking and was receiving help from a friend/neighbor. In addition, the patient had not taken most of her medications since Sunday, including levothyroxine. The only medication the patient consistently took since Sunday was carvedilol. Patient denies fever, chills, or dysuria. Patient's niece endorses that the patient had a few episodes of confusion and being "spacey" for the past few days. Patient's niece also states that she noticed vulvar/vaginal redness when changing the patient's diaper. Patient endorses chronic incontinence and knee pain. Patient also endorses constipation. Patient denies abdominal pain, nausea, vomiting, chest pain, and shortness of breath.  Of note, patient is hard of hearing.    IN THE ED  Vitals: /70 | HR 51  | RR 18  | Temp 98.8F | SpO2 97% on RA   S/p: 1L NS, IVP levothyroxine 88 MCG x1, chase placement  Labs: WBC 7.92| | eGFR 41 | Cr 1.2 BUN 17| UA negative for nitrite and leukocyte esterase  Imaging  < from: CT Abdomen and Pelvis No Cont (10.07.23 @ 18:11) >  IMPRESSION:    Moderate volume of stool distending the rectum, with rectal wall   thickening and perirectal fat infiltration, constellation of findings   suggestive of stercoral colitis.  Distended urinary bladder, may represent urinary retention.  Cholelithiasis.    < end of copied text >    < from: CT Head No Cont (10.07.23 @ 18:07) >  IMPRESSION:   Mild to moderate periventricular and deep white matter   ischemia. 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    Enlarged empty sella.  Opacification of the LEFT sphenoid   sinus with mucosal thickening consistent with chronic sinusitis.    < end of copied text >   (07 Oct 2023 22:23)      ---  HOSPITAL COURSE: Patient admitted to medicine floor for management of     Pt seen and examined on day of discharge. Patient is medically optimized for discharge to home with close outpatient followup.    PHYSICAL EXAM ON DAY OF DISCHARGE:        ---  CONSULTANTS:     ---  TIME SPENT:  I, the attending physician, was physically present for the key portions of the evaluation and management (E/M) service provided. The total amount of time spent reviewing the hospital notes, laboratory values, imaging findings, assessing/counseling the patient, discussing with consultant physicians, social work, nursing staff was -- minutes    ---  Primary care provider was made aware of plan for discharge:      [  ] NO     [  ] YES   HPI:  89yF PMHx of hypothyroidism, osteoarthritis, HLD, and HTN presenting with weakness since Sunday, Patient and patient's niece state the patient has been experiencing weakness, fatigue, confusion, and decreased appetite which has been progressing since Sunday. The patient also had difficulty standing and walking and was receiving help from a friend/neighbor. In addition, the patient had not taken most of her medications since Sunday, including levothyroxine. The only medication the patient consistently took since Sunday was carvedilol. Patient denies fever, chills, or dysuria. Patient's niece endorses that the patient had a few episodes of confusion and being "spacey" for the past few days. Patient's niece also states that she noticed vulvar/vaginal redness when changing the patient's diaper. Patient endorses chronic incontinence and knee pain. Patient also endorses constipation. Patient denies abdominal pain, nausea, vomiting, chest pain, and shortness of breath.  Of note, patient is hard of hearing.    IN THE ED  Vitals: /70 | HR 51  | RR 18  | Temp 98.8F | SpO2 97% on RA   S/p: 1L NS, IVP levothyroxine 88 MCG x1, chase placement  Labs: WBC 7.92| | eGFR 41 | Cr 1.2 BUN 17| UA negative for nitrite and leukocyte esterase  Imaging  < from: CT Abdomen and Pelvis No Cont (10.07.23 @ 18:11) >  IMPRESSION:    Moderate volume of stool distending the rectum, with rectal wall   thickening and perirectal fat infiltration, constellation of findings   suggestive of stercoral colitis.  Distended urinary bladder, may represent urinary retention.  Cholelithiasis.    < end of copied text >    < from: CT Head No Cont (10.07.23 @ 18:07) >  IMPRESSION:   Mild to moderate periventricular and deep white matter   ischemia. 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    Enlarged empty sella.  Opacification of the LEFT sphenoid   sinus with mucosal thickening consistent with chronic sinusitis.    < end of copied text >   (07 Oct 2023 22:23)      ---  HOSPITAL COURSE: Patient admitted to medicine floor for management of weakness, urinary retention. Patient was seen by urology who recommended TOV as outpatient, d/c with Chase. Found with hypothyroidism on admit, started on levothyroxine 88 mcg, follow up in 4 weeks with PCP.     Pt seen and examined on day of discharge. Patient is medically optimized for discharge to home with close outpatient followup.    PHYSICAL EXAM ON DAY OF DISCHARGE:        ---  CONSULTANTS:     ---  TIME SPENT:  I, the attending physician, was physically present for the key portions of the evaluation and management (E/M) service provided. The total amount of time spent reviewing the hospital notes, laboratory values, imaging findings, assessing/counseling the patient, discussing with consultant physicians, social work, nursing staff was -- minutes    ---  Primary care provider was made aware of plan for discharge:      [  ] NO     [  ] YES   HPI:  89yF PMHx of hypothyroidism, osteoarthritis, HLD, and HTN presenting with weakness since Sunday, Patient and patient's niece state the patient has been experiencing weakness, fatigue, confusion, and decreased appetite which has been progressing since Sunday. The patient also had difficulty standing and walking and was receiving help from a friend/neighbor. In addition, the patient had not taken most of her medications since Sunday, including levothyroxine. The only medication the patient consistently took since Sunday was carvedilol. Patient denies fever, chills, or dysuria. Patient's niece endorses that the patient had a few episodes of confusion and being "spacey" for the past few days. Patient's niece also states that she noticed vulvar/vaginal redness when changing the patient's diaper. Patient endorses chronic incontinence and knee pain. Patient also endorses constipation. Patient denies abdominal pain, nausea, vomiting, chest pain, and shortness of breath.  Of note, patient is hard of hearing.    IN THE ED  Vitals: /70 | HR 51  | RR 18  | Temp 98.8F | SpO2 97% on RA   S/p: 1L NS, IVP levothyroxine 88 MCG x1, chase placement  Labs: WBC 7.92| | eGFR 41 | Cr 1.2 BUN 17| UA negative for nitrite and leukocyte esterase  Imaging  < from: CT Abdomen and Pelvis No Cont (10.07.23 @ 18:11) >  IMPRESSION:    Moderate volume of stool distending the rectum, with rectal wall   thickening and perirectal fat infiltration, constellation of findings   suggestive of stercoral colitis.  Distended urinary bladder, may represent urinary retention.  Cholelithiasis.    < end of copied text >    < from: CT Head No Cont (10.07.23 @ 18:07) >  IMPRESSION:   Mild to moderate periventricular and deep white matter   ischemia. 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    Enlarged empty sella.  Opacification of the LEFT sphenoid   sinus with mucosal thickening consistent with chronic sinusitis.    < end of copied text >   (07 Oct 2023 22:23)      ---  HOSPITAL COURSE: Patient admitted to medicine floor for management of weakness, urinary retention. Patient was seen by urology who recommended TOV as outpatient, d/c with Chase. Found with hypothyroidism on admit, started on levothyroxine 88 mcg, follow up in 4 weeks with PCP.     Pt seen and examined on day of discharge. Patient is medically optimized for discharge to home with close outpatient followup.    PHYSICAL EXAM ON DAY OF DISCHARGE:        ---  CONSULTANTS:     ---  TIME SPENT:  I, the attending physician, was physically present for the key portions of the evaluation and management (E/M) service provided. The total amount of time spent reviewing the hospital notes, laboratory values, imaging findings, assessing/counseling the patient, discussing with consultant physicians, social work, nursing staff was 35 minutes     HPI:  89yF PMHx of hypothyroidism, osteoarthritis, HLD, and HTN presenting with weakness since Sunday, Patient and patient's niece state the patient has been experiencing weakness, fatigue, confusion, and decreased appetite which has been progressing since Sunday. The patient also had difficulty standing and walking and was receiving help from a friend/neighbor. In addition, the patient had not taken most of her medications since Sunday, including levothyroxine. The only medication the patient consistently took since Sunday was carvedilol. Patient denies fever, chills, or dysuria. Patient's niece endorses that the patient had a few episodes of confusion and being "spacey" for the past few days. Patient's niece also states that she noticed vulvar/vaginal redness when changing the patient's diaper. Patient endorses chronic incontinence and knee pain. Patient also endorses constipation. Patient denies abdominal pain, nausea, vomiting, chest pain, and shortness of breath.  Of note, patient is hard of hearing.    IN THE ED  Vitals: /70 | HR 51  | RR 18  | Temp 98.8F | SpO2 97% on RA   S/p: 1L NS, IVP levothyroxine 88 MCG x1, chase placement  Labs: WBC 7.92| | eGFR 41 | Cr 1.2 BUN 17| UA negative for nitrite and leukocyte esterase  Imaging  < from: CT Abdomen and Pelvis No Cont (10.07.23 @ 18:11) >  IMPRESSION:    Moderate volume of stool distending the rectum, with rectal wall   thickening and perirectal fat infiltration, constellation of findings   suggestive of stercoral colitis.  Distended urinary bladder, may represent urinary retention.  Cholelithiasis.    < end of copied text >    < from: CT Head No Cont (10.07.23 @ 18:07) >  IMPRESSION:   Mild to moderate periventricular and deep white matter   ischemia. 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    Enlarged empty sella.  Opacification of the LEFT sphenoid   sinus with mucosal thickening consistent with chronic sinusitis.    < end of copied text >   (07 Oct 2023 22:23)      ---  HOSPITAL COURSE: Patient admitted to medicine floor for management of weakness, urinary retention. Patient was seen by urology who recommended TOV as outpatient, d/c with Chase. Found with hypothyroidism on admit, started on levothyroxine 88 mcg, follow up in 4 weeks with PCP.     Pt seen and examined on day of discharge. Patient is medically optimized for discharge to home with close outpatient followup.    PHYSICAL EXAM ON DAY OF DISCHARGE:  T(C): 36.9 (10-16-23 @ 04:47), Max: 36.9 (10-15-23 @ 20:48)  HR: 75 (10-16-23 @ 04:47) (74 - 90)  BP: 142/57 (10-16-23 @ 04:47) (127/52 - 142/57)  RR: 17 (10-16-23 @ 04:47) (17 - 17)  SpO2: 93% (10-16-23 @ 04:47) (92% - 94%)    GENERAL: patient appears well, no acute distress, appropriate, pleasant  EYES: sclera clear, no exudates  ENMT: oropharynx clear without erythema, no exudates, moist mucous membranes  NECK: supple, soft, no thyromegaly noted  LUNGS: good air entry bilaterally, clear to auscultation  HEART: soft S1/S2, regular rate and rhythm, no murmurs noted, no lower extremity edema  GASTROINTESTINAL: abdomen is soft, nontender, nondistended, normoactive bowel sounds, no palpable masses  INTEGUMENT: good skin turgor, no lesions noted  MUSCULOSKELETAL: no clubbing or cyanosis, no obvious deformity  NEUROLOGIC: awake, alert      ---  CONSULTANTS:     ---  TIME SPENT:  I, the attending physician, was physically present for the key portions of the evaluation and management (E/M) service provided. The total amount of time spent reviewing the hospital notes, laboratory values, imaging findings, assessing/counseling the patient, discussing with consultant physicians, social work, nursing staff was 35 minutes

## 2023-10-09 NOTE — CARE COORDINATION ASSESSMENT. - OTHER PERTINENT DISCHARGE PLANNING INFORMATION:
Met with patient and niece at bedside to discuss the role of case management with verbalized understanding.  Needs unclear at present.  Patient [facility] moved from [facility] into [facility] home.  Patient has no PCP in area (Discussed [facility].)  THey are currently not interested in HA, physical therapy services.  They are requesting a hospital bed for D/C.  Will remain available from a case management perspective.

## 2023-10-09 NOTE — CARE COORDINATION ASSESSMENT. - NS SW HOME EQUIPMENT
Per Niece patient is non ambulatory.  THey are requesting a hospital bed for d/c/cane/walker/wheelchair

## 2023-10-09 NOTE — CARE COORDINATION ASSESSMENT. - PRO ARRIVE FROM
Recently moved from [facility] into [facility] home.  [address] [facility].  This has occurred within the last week/home

## 2023-10-09 NOTE — CARE COORDINATION ASSESSMENT. - NSCAREPROVIDERS_GEN_ALL_CORE_FT
CARE PROVIDERS:  Accepting Physician: Rika Vance  Administration: Jean Maher  Administration: John Zuñiga  Administration: Kye Islsa  Administration: Vazquez Castillo  Administration: Annabel Carrillo  Admitting: Rika Vance  Attending: Rika Vance  Case Management: Tremaine Dietz  Consultant: Perlman, Craig  Consultant: Weil, Patricia ED ACP: Ladarius Mg  ED Attending: Patty Hobbs ED Nurse: Luis Avila  Nurse: Gloria Borrego  Nurse: Gladys Flannery  Nurse: Jody Bartlett  Ordered: ADM, User  Ordered: ServiceAccount, SCMMLM  PCA/Nursing Assistant: Latasha Obrien  PCA/Nursing Assistant: Juju Basilio  Physical Therapy: Jaclyn Noe  Primary Team: Rika Vance  Primary Team: Patience Hensley  Primary Team: Leatha Landry  Primary Team: Anjali Thacker  Registered Dietitian: Evelin Dave  : Prema Tolentino  : Ludivina Hawkins  Speech Pathology: Shama Dwyer  Team: [facility], Team

## 2023-10-10 LAB — TSH SERPL-MCNC: 110 UIU/ML — HIGH (ref 0.36–3.74)

## 2023-10-10 PROCEDURE — 99232 SBSQ HOSP IP/OBS MODERATE 35: CPT

## 2023-10-10 RX ORDER — ACETAMINOPHEN 500 MG
650 TABLET ORAL ONCE
Refills: 0 | Status: COMPLETED | OUTPATIENT
Start: 2023-10-10 | End: 2023-10-10

## 2023-10-10 RX ORDER — IBUPROFEN 200 MG
400 TABLET ORAL EVERY 8 HOURS
Refills: 0 | Status: DISCONTINUED | OUTPATIENT
Start: 2023-10-10 | End: 2023-10-16

## 2023-10-10 RX ADMIN — Medication 650 MILLIGRAM(S): at 03:58

## 2023-10-10 RX ADMIN — Medication 650 MILLIGRAM(S): at 03:28

## 2023-10-10 RX ADMIN — HEPARIN SODIUM 5000 UNIT(S): 5000 INJECTION INTRAVENOUS; SUBCUTANEOUS at 06:41

## 2023-10-10 RX ADMIN — Medication 400 MILLIGRAM(S): at 12:45

## 2023-10-10 RX ADMIN — Medication 88 MICROGRAM(S): at 06:41

## 2023-10-10 RX ADMIN — ZINC OXIDE 1 APPLICATION(S): 200 OINTMENT TOPICAL at 17:47

## 2023-10-10 RX ADMIN — AMLODIPINE BESYLATE 5 MILLIGRAM(S): 2.5 TABLET ORAL at 06:41

## 2023-10-10 RX ADMIN — HEPARIN SODIUM 5000 UNIT(S): 5000 INJECTION INTRAVENOUS; SUBCUTANEOUS at 14:50

## 2023-10-10 RX ADMIN — CARVEDILOL PHOSPHATE 6.25 MILLIGRAM(S): 80 CAPSULE, EXTENDED RELEASE ORAL at 17:46

## 2023-10-10 RX ADMIN — ZINC OXIDE 1 APPLICATION(S): 200 OINTMENT TOPICAL at 06:40

## 2023-10-10 RX ADMIN — Medication 400 MILLIGRAM(S): at 11:58

## 2023-10-10 RX ADMIN — Medication 650 MILLIGRAM(S): at 18:56

## 2023-10-10 NOTE — SWALLOW BEDSIDE ASSESSMENT ADULT - COMMENTS
Hospitalist note 10/10 "89yF PMHx of hypothyroidism, osteoarthritis, HLD, and HTN presenting with weakness, fatigue, confusion, and decreased appetite which has been progressing since Sunday. Admitted for weakness, urinary retention, and hypothyroidism ( on admission)."    CXR 10/7 "No acute finding."    Pt seen at bedside, awake/alert, during clinical swallow evaluation this AM. Pt's niece present at bedside who reported pt consumes "minced foods" but has had reduced appetite recently. Pt able to follow simple directions and answer simple yes/no questions.

## 2023-10-10 NOTE — PROGRESS NOTE ADULT - PROBLEM SELECTOR PLAN 7
- DVT ppx: subQ heparin q8
- DVT ppx: subQ heparin q8
- DVT ppx: subQ heparin q8  - Patient and niece interested in learning more about GOC but not interested in filling out a MOLST form at time of admission. Palliative consulted for GOC discussion

## 2023-10-10 NOTE — PROGRESS NOTE ADULT - PROBLEM SELECTOR PLAN 4
chronic  - cont. home atorvastatin

## 2023-10-10 NOTE — CHART NOTE - NSCHARTNOTEFT_GEN_A_CORE
Based on patient's ongoing issues with dysphasia ICD R13.10 patient will require a semi elctric hospital bed. Thisis a necessary to achieve positioning and elevation not able to be obtained in an ordinary bed. Bed pillows and wedges have been tried and ruled out. The patient will require a gel overlay to prevent pressure ulcers. Based on patient's ongoing issues with dysphasia, risk of aspiration  ICD R13.10 patient requires frequent and immediate repositioning changes that are not feasible in a regular bed with pillows or wedges. Patient needs head of bed elevated more than 30 degrees most of the time due to aspiration risk. Patient needs gel overlay to prevent skin breakdown, decubitus ulcer.

## 2023-10-10 NOTE — PROGRESS NOTE ADULT - PROBLEM SELECTOR PLAN 1
- likely in the setting of uncontrolled hypothyroidism  - on admission  - constipation and urinary retention  - s/p 1L NS,  chase placement in ED  - endocrine (Dr. Perlman) consulted, recommendations appreciated   -SLP consulted  -PT evaluation - daughter would like patient to go home   -Palliative care consulted - interested in DNR/DNI, limited interventions Likely in the setting of uncontrolled hypothyroidism  - on admission  - constipation and urinary retention  - s/p 1L NS,  chase placement in ED  - endocrine (Dr. Perlman) consulted, recommendations appreciated   -SLP consulted  -PT evaluation - daughter would like patient to go home   -Palliative care consulted - interested in DNR/DNI, limited interventions    Patient with chronic dementia at baseline, worsening requiring niece to drive from [place] where patient resides alone to bring her to [place] to live with her. Patient forgot to take her medications for several weeks / ?months. Patient with poor PO intake unless reminded to eat. Patient mainly bedbound. Unable to care for herself. Hospice eval requested.

## 2023-10-10 NOTE — PROGRESS NOTE ADULT - PROBLEM SELECTOR PLAN 3
CT A/P: Moderate volume of stool distending the rectum and distended urinary bladder  - UA negative for nitrite and leukocyte esterase  - urinary retention may be secondary to constipation  - dulcolax suppository x1  - bowel regimen (miralax and senna)  - desitin ordered for vulvovaginal redness  - chase placed
CT A/P: Moderate volume of stool distending the rectum and distended urinary bladder  - UA negative for nitrite and leukocyte esterase  - urinary retention may be secondary to constipation  - dulcolax suppository x1  - bowel regimen (miralax and senna)  - desitin ordered for vulvovaginal redness  - chase placed - DC chase. TOV 10/9
CT A/P: Moderate volume of stool distending the rectum and distended urinary bladder  - UA negative for nitrite and leukocyte esterase  - urinary retention may be secondary to constipation  - dulcolax suppository x1  - bowel regimen (miralax and senna)  - desitin ordered for vulvovaginal redness  - chase placed - DC chase. TOV 10/9

## 2023-10-10 NOTE — PROGRESS NOTE ADULT - PROBLEM SELECTOR PLAN 6
Chronic knee pain likely secondary to OA  - bilateral knee edema (R>L) on exam  -Switch Tylenol to standing  - bilateral knee x-ray ordered. Daughter declining would like limited medical intervention

## 2023-10-10 NOTE — PROGRESS NOTE ADULT - PROBLEM SELECTOR PLAN 2
TSH on admission 129  Patient usually takes levothyroxine 88 mcg but patient's niece reports patient hasn't taken it since [place] 10/1 or prior   s/p IVP levothyroxine 88 mcg x1 in ED  - T3 and T4 performed   - will restart. home levothyroxine  - endocrine (Dr. Perlman) consulted recommendations appreciated

## 2023-10-10 NOTE — SOCIAL WORK PROGRESS NOTE - NSSWPROGRESSNOTE_GEN_ALL_CORE
FLORIN met with this pts niece to discuss DC. Plan for dc home with home hospice, shraddha reviewed [facility]- referral sent. FLORIN to follow.

## 2023-10-10 NOTE — SWALLOW BEDSIDE ASSESSMENT ADULT - SWALLOW EVAL: DIAGNOSIS
1) Mild oral dysphagia for minced and moist solids, puree, moderately thick, mildly thick, and thin liquids marked by reduced utensil stripping, prolonged manipulation and reduced coordination resulting in delayed posterior transfer/transit. Mild lingual stasis noted which was cleared with liquid wash. 2) Functional pharyngeal stage of swallow for minced and moist, puree, moderately thick, mildly thick, and thin liquids marked by initiation of pharyngeal swallow trigger and hyolaryngeal excursion noted upon digital palpation. No overt signs/symptoms of penetration/aspiration noted.

## 2023-10-10 NOTE — SOCIAL WORK PROGRESS NOTE - NSSWPROGRESSNOTE_GEN_ALL_CORE
FLORIN spoke with pts niece at bedside- pt denied by hospice at tthis time, MD spoke with hospice MD. Plan remains for pt to return home when ready for DC. FLORIN to follow.

## 2023-10-11 LAB
ANION GAP SERPL CALC-SCNC: 6 MMOL/L — SIGNIFICANT CHANGE UP (ref 5–17)
BUN SERPL-MCNC: 16 MG/DL — SIGNIFICANT CHANGE UP (ref 7–23)
CALCIUM SERPL-MCNC: 8.6 MG/DL — SIGNIFICANT CHANGE UP (ref 8.5–10.1)
CHLORIDE SERPL-SCNC: 109 MMOL/L — HIGH (ref 96–108)
CO2 SERPL-SCNC: 26 MMOL/L — SIGNIFICANT CHANGE UP (ref 22–31)
CREAT SERPL-MCNC: 1.2 MG/DL — SIGNIFICANT CHANGE UP (ref 0.5–1.3)
EGFR: 41 ML/MIN/1.73M2 — LOW
GLUCOSE SERPL-MCNC: 148 MG/DL — HIGH (ref 70–99)
HCT VFR BLD CALC: 33.7 % — LOW (ref 34.5–45)
HGB BLD-MCNC: 11.6 G/DL — SIGNIFICANT CHANGE UP (ref 11.5–15.5)
MCHC RBC-ENTMCNC: 33.5 PG — SIGNIFICANT CHANGE UP (ref 27–34)
MCHC RBC-ENTMCNC: 34.4 GM/DL — SIGNIFICANT CHANGE UP (ref 32–36)
MCV RBC AUTO: 97.4 FL — SIGNIFICANT CHANGE UP (ref 80–100)
NRBC # BLD: 0 /100 WBCS — SIGNIFICANT CHANGE UP (ref 0–0)
PLATELET # BLD AUTO: 194 K/UL — SIGNIFICANT CHANGE UP (ref 150–400)
POTASSIUM SERPL-MCNC: 3.8 MMOL/L — SIGNIFICANT CHANGE UP (ref 3.5–5.3)
POTASSIUM SERPL-SCNC: 3.8 MMOL/L — SIGNIFICANT CHANGE UP (ref 3.5–5.3)
RBC # BLD: 3.46 M/UL — LOW (ref 3.8–5.2)
RBC # FLD: 14.8 % — HIGH (ref 10.3–14.5)
SODIUM SERPL-SCNC: 141 MMOL/L — SIGNIFICANT CHANGE UP (ref 135–145)
WBC # BLD: 7.19 K/UL — SIGNIFICANT CHANGE UP (ref 3.8–10.5)
WBC # FLD AUTO: 7.19 K/UL — SIGNIFICANT CHANGE UP (ref 3.8–10.5)

## 2023-10-11 PROCEDURE — 99232 SBSQ HOSP IP/OBS MODERATE 35: CPT

## 2023-10-11 RX ORDER — AMLODIPINE BESYLATE 2.5 MG/1
10 TABLET ORAL DAILY
Refills: 0 | Status: DISCONTINUED | OUTPATIENT
Start: 2023-10-11 | End: 2023-10-13

## 2023-10-11 RX ORDER — CARVEDILOL PHOSPHATE 80 MG/1
12.5 CAPSULE, EXTENDED RELEASE ORAL
Refills: 0 | Status: DISCONTINUED | OUTPATIENT
Start: 2023-10-11 | End: 2023-10-11

## 2023-10-11 RX ORDER — ACETAMINOPHEN 500 MG
650 TABLET ORAL ONCE
Refills: 0 | Status: COMPLETED | OUTPATIENT
Start: 2023-10-11 | End: 2023-10-11

## 2023-10-11 RX ADMIN — Medication 400 MILLIGRAM(S): at 14:45

## 2023-10-11 RX ADMIN — ZINC OXIDE 1 APPLICATION(S): 200 OINTMENT TOPICAL at 05:10

## 2023-10-11 RX ADMIN — HEPARIN SODIUM 5000 UNIT(S): 5000 INJECTION INTRAVENOUS; SUBCUTANEOUS at 21:35

## 2023-10-11 RX ADMIN — AMLODIPINE BESYLATE 10 MILLIGRAM(S): 2.5 TABLET ORAL at 18:40

## 2023-10-11 RX ADMIN — Medication 400 MILLIGRAM(S): at 05:07

## 2023-10-11 RX ADMIN — Medication 88 MICROGRAM(S): at 05:08

## 2023-10-11 RX ADMIN — Medication 650 MILLIGRAM(S): at 06:43

## 2023-10-11 RX ADMIN — Medication 650 MILLIGRAM(S): at 21:35

## 2023-10-11 RX ADMIN — AMLODIPINE BESYLATE 5 MILLIGRAM(S): 2.5 TABLET ORAL at 05:08

## 2023-10-11 RX ADMIN — HEPARIN SODIUM 5000 UNIT(S): 5000 INJECTION INTRAVENOUS; SUBCUTANEOUS at 14:45

## 2023-10-11 RX ADMIN — HEPARIN SODIUM 5000 UNIT(S): 5000 INJECTION INTRAVENOUS; SUBCUTANEOUS at 05:08

## 2023-10-11 RX ADMIN — ATORVASTATIN CALCIUM 20 MILLIGRAM(S): 80 TABLET, FILM COATED ORAL at 21:35

## 2023-10-11 RX ADMIN — Medication 650 MILLIGRAM(S): at 22:05

## 2023-10-11 RX ADMIN — Medication 400 MILLIGRAM(S): at 05:37

## 2023-10-11 NOTE — PROGRESS NOTE ADULT - ASSESSMENT
89F with hypothyroidism, osteoarthritis, HLD, and HTN admitted for generalized weakness, confusion and poor oral intake found to have severe hypothyroidism  ( on admission) without myxedema coma     1. Hypothyroidism   Without symptoms of myxedema coma   Started on synthroid 88mcg   Repeat TFTs in 4 weeks     2. Acute urinary retention   Likely due to hypothyroidism itself and resultant constipation  Chase catheter placed on presentation, chase removed, voiding    3. Constipation   Miralax, senna, PT, OOB    4. HLD  Continue statin although no appreciate long term benefit at this age  Will talk to family regarding dc non essential eds     -PT evaluation - daughter would like patient to go home   -Palliative care consulted - interested in DNR/DNI, limited interventions     5. HTN   BP elevated   increase amlodipine to 10mg nad coreg to 12.5mg BID     6. Advance care planning  DNR/DNI   Does not qualify for hospice given no terminal diagnosis  Plan for dismissal home with niece tentatively tomorrow   Discussed with family   89F with hypothyroidism, osteoarthritis, HLD, and HTN admitted for generalized weakness, confusion and poor oral intake found to have severe hypothyroidism  ( on admission) without myxedema coma     1. Hypothyroidism   Without symptoms of myxedema coma   Started on synthroid 88mcg   Repeat TFTs in 4 weeks     2. Acute urinary retention   Likely due to hypothyroidism itself and resultant constipation  Chase catheter placed on presentation, chase removed, voiding    3. Constipation   Miralax, senna, PT, OOB    4. HLD  Continue statin although no appreciate long term benefit at this age  Will talk to family regarding dc non essential eds     -PT evaluation - daughter would like patient to go home   -Palliative care consulted - interested in DNR/DNI, limited interventions     5. HTN   BP elevated   Increase amlodipine to 10mg and stop coreg given HR ranging 40-60bpm  Will monitor BP and adjust regimen accordingly      6. Advance care planning  DNR/DNI   Does not qualify for hospice given no terminal diagnosis  Plan for dismissal home with niece tentatively tomorrow   Discussed with family   89F with hypothyroidism, osteoarthritis, HLD, and HTN admitted for generalized weakness, confusion and poor oral intake found to have severe hypothyroidism  ( on admission) without myxedema coma     1. Hypothyroidism   Without symptoms of myxedema coma   Started on synthroid 88mcg   Repeat TFTs in 4 weeks     2. Acute urinary retention   Likely due to hypothyroidism itself and resultant constipation  Chase catheter placed on presentation, chase removed, voiding    3. Constipation   Miralax, senna, PT, OOB    4. HLD  Continue statin although no appreciate long term benefit at this age  Will talk to family regarding dc non essential eds     -PT evaluation - daughter would like patient to go home   -Palliative care consulted - interested in DNR/DNI, limited interventions     5. HTN   BP elevated   Increase amlodipine to 10mg and stop coreg given HR ranging 40-50bpm  Will monitor BP and adjust regimen accordingly      6. Advance care planning  DNR/DNI   Does not qualify for hospice given no terminal diagnosis  Plan for dismissal home with niece tentatively tomorrow   Discussed with family   89F with hypothyroidism, osteoarthritis, HLD, and HTN admitted for generalized weakness, confusion and poor oral intake found to have severe hypothyroidism  ( on admission) without myxedema coma     1. Hypothyroidism   Without symptoms of myxedema coma   Started on synthroid 88mcg   Repeat TFTs in 4 weeks     2. Acute urinary retention   Likely due to hypothyroidism itself and resultant constipation  Krishna removed, voiding    3. Constipation   Miralax, senna, PT, OOB    4. HLD  Continue statin although no appreciate long term benefit at this age  Will talk to family regarding dc non essential eds     -PT evaluation - daughter would like patient to go home   -Palliative care consulted - interested in DNR/DNI, limited interventions     5. HTN   BP elevated   Increase amlodipine to 10mg and stop coreg given HR ranging 40-50bpm  Will monitor BP and adjust regimen accordingly      6. Advance care planning  DNR/DNI   Does not qualify for hospice given no terminal diagnosis  Plan for dismissal home with niece tentatively tomorrow   Discussed with family

## 2023-10-11 NOTE — DISCHARGE NOTE NURSING/CASE MANAGEMENT/SOCIAL WORK - PATIENT PORTAL LINK FT
You can access the FollowMyHealth Patient Portal offered by [facility] by registering at the following website: [email or web address]. By joining [employer]’s FollowMyHealth portal, you will also be able to view your health information using other applications (apps) compatible with our system.

## 2023-10-11 NOTE — PATIENT CHOICE NOTE. - NSPTCHOICESTATE_GEN_ALL_CORE
I have met with the patient and/or caregiver to discuss discharge goals and treatment plan. Patient and/or caregiver also provided with instructions on accessing the CMS Compare websites for additional information related to Post Acute Provider quality and resource use measures to assist them in evaluation of the providers and in selecting their post-acute provider of choice. Patient and caregiver were informed of the facilities that are owned and/or operated by [facility]. I have discussed with the patient the availability of in-network facilities and providers. Patient and caregiver provided with a list of post-acute providers whose services are appropriate to the discharge plans and patient needs.     For patient requiring durable medical equipment, patient and/or caregiver were informed that they have the right to request who provides the required equipment.
I have met with the patient and/or caregiver to discuss discharge goals and treatment plan. Patient and/or caregiver also provided with instructions on accessing the CMS Compare websites for additional information related to Post Acute Provider quality and resource use measures to assist them in evaluation of the providers and in selecting their post-acute provider of choice. Patient and caregiver were informed of the facilities that are owned and/or operated by [facility]. I have discussed with the patient the availability of in-network facilities and providers. Patient and caregiver provided with a list of post-acute providers whose services are appropriate to the discharge plans and patient needs.     For patient requiring durable medical equipment, patient and/or caregiver were informed that they have the right to request who provides the required equipment.

## 2023-10-11 NOTE — DISCHARGE NOTE NURSING/CASE MANAGEMENT/SOCIAL WORK - NSDCPEFALRISK_GEN_ALL_CORE
For information on Fall & Injury Prevention, visit: [email or web address] OR  [email or web address] OR  https://www.cdc.gov/steadi/patient.html

## 2023-10-11 NOTE — CASE MANAGEMENT PROGRESS NOTE - NSCMPROGRESSNOTE_GEN_ALL_CORE
Met with patient and rhiannonnewton at bedside to discuss transition planning.  [facility]  Patient had a chase cathter placed yesterday and is anticipated for D/C with chase when ready.  Jacquelyn Fountain states she will care for chase and patient at her residence.  Discussed transition resources and she elects referral be sent to [facility] at home for visiting nurse services.  Will remain available from a case management perspective.

## 2023-10-12 LAB
ALBUMIN SERPL ELPH-MCNC: 3.4 G/DL — SIGNIFICANT CHANGE UP (ref 3.3–5)
ALP SERPL-CCNC: 80 U/L — SIGNIFICANT CHANGE UP (ref 40–120)
ALT FLD-CCNC: 23 U/L — SIGNIFICANT CHANGE UP (ref 12–78)
ANION GAP SERPL CALC-SCNC: 9 MMOL/L — SIGNIFICANT CHANGE UP (ref 5–17)
AST SERPL-CCNC: 30 U/L — SIGNIFICANT CHANGE UP (ref 15–37)
BILIRUB SERPL-MCNC: 0.5 MG/DL — SIGNIFICANT CHANGE UP (ref 0.2–1.2)
BUN SERPL-MCNC: 22 MG/DL — SIGNIFICANT CHANGE UP (ref 7–23)
CALCIUM SERPL-MCNC: 8.8 MG/DL — SIGNIFICANT CHANGE UP (ref 8.5–10.1)
CHLORIDE SERPL-SCNC: 105 MMOL/L — SIGNIFICANT CHANGE UP (ref 96–108)
CO2 SERPL-SCNC: 24 MMOL/L — SIGNIFICANT CHANGE UP (ref 22–31)
CREAT SERPL-MCNC: 1.3 MG/DL — SIGNIFICANT CHANGE UP (ref 0.5–1.3)
EGFR: 37 ML/MIN/1.73M2 — LOW
GLUCOSE SERPL-MCNC: 114 MG/DL — HIGH (ref 70–99)
HCT VFR BLD CALC: 33.7 % — LOW (ref 34.5–45)
HGB BLD-MCNC: 11.6 G/DL — SIGNIFICANT CHANGE UP (ref 11.5–15.5)
MAGNESIUM SERPL-MCNC: 2.3 MG/DL — SIGNIFICANT CHANGE UP (ref 1.6–2.6)
MCHC RBC-ENTMCNC: 33.1 PG — SIGNIFICANT CHANGE UP (ref 27–34)
MCHC RBC-ENTMCNC: 34.4 GM/DL — SIGNIFICANT CHANGE UP (ref 32–36)
MCV RBC AUTO: 96.3 FL — SIGNIFICANT CHANGE UP (ref 80–100)
NRBC # BLD: 0 /100 WBCS — SIGNIFICANT CHANGE UP (ref 0–0)
PLATELET # BLD AUTO: 235 K/UL — SIGNIFICANT CHANGE UP (ref 150–400)
POTASSIUM SERPL-MCNC: 3.8 MMOL/L — SIGNIFICANT CHANGE UP (ref 3.5–5.3)
POTASSIUM SERPL-SCNC: 3.8 MMOL/L — SIGNIFICANT CHANGE UP (ref 3.5–5.3)
PROT SERPL-MCNC: 6.6 G/DL — SIGNIFICANT CHANGE UP (ref 6–8.3)
RBC # BLD: 3.5 M/UL — LOW (ref 3.8–5.2)
RBC # FLD: 14.6 % — HIGH (ref 10.3–14.5)
SODIUM SERPL-SCNC: 138 MMOL/L — SIGNIFICANT CHANGE UP (ref 135–145)
WBC # BLD: 9.7 K/UL — SIGNIFICANT CHANGE UP (ref 3.8–10.5)
WBC # FLD AUTO: 9.7 K/UL — SIGNIFICANT CHANGE UP (ref 3.8–10.5)

## 2023-10-12 PROCEDURE — 99232 SBSQ HOSP IP/OBS MODERATE 35: CPT

## 2023-10-12 RX ORDER — LACTULOSE 10 G/15ML
20 SOLUTION ORAL ONCE
Refills: 0 | Status: DISCONTINUED | OUTPATIENT
Start: 2023-10-12 | End: 2023-10-16

## 2023-10-12 RX ORDER — SENNA PLUS 8.6 MG/1
2 TABLET ORAL ONCE
Refills: 0 | Status: DISCONTINUED | OUTPATIENT
Start: 2023-10-12 | End: 2023-10-16

## 2023-10-12 RX ORDER — ACETAMINOPHEN 500 MG
650 TABLET ORAL EVERY 6 HOURS
Refills: 0 | Status: DISCONTINUED | OUTPATIENT
Start: 2023-10-12 | End: 2023-10-16

## 2023-10-12 RX ORDER — BETHANECHOL CHLORIDE 25 MG
25 TABLET ORAL
Refills: 0 | Status: DISCONTINUED | OUTPATIENT
Start: 2023-10-12 | End: 2023-10-16

## 2023-10-12 RX ORDER — HYDRALAZINE HCL 50 MG
50 TABLET ORAL THREE TIMES A DAY
Refills: 0 | Status: DISCONTINUED | OUTPATIENT
Start: 2023-10-12 | End: 2023-10-13

## 2023-10-12 RX ADMIN — HEPARIN SODIUM 5000 UNIT(S): 5000 INJECTION INTRAVENOUS; SUBCUTANEOUS at 15:16

## 2023-10-12 RX ADMIN — Medication 400 MILLIGRAM(S): at 23:30

## 2023-10-12 RX ADMIN — HEPARIN SODIUM 5000 UNIT(S): 5000 INJECTION INTRAVENOUS; SUBCUTANEOUS at 22:23

## 2023-10-12 RX ADMIN — Medication 50 MILLIGRAM(S): at 15:15

## 2023-10-12 RX ADMIN — Medication 88 MICROGRAM(S): at 05:53

## 2023-10-12 RX ADMIN — AMLODIPINE BESYLATE 10 MILLIGRAM(S): 2.5 TABLET ORAL at 05:54

## 2023-10-12 RX ADMIN — HEPARIN SODIUM 5000 UNIT(S): 5000 INJECTION INTRAVENOUS; SUBCUTANEOUS at 05:53

## 2023-10-12 RX ADMIN — Medication 650 MILLIGRAM(S): at 17:04

## 2023-10-12 RX ADMIN — Medication 400 MILLIGRAM(S): at 15:45

## 2023-10-12 RX ADMIN — Medication 400 MILLIGRAM(S): at 07:05

## 2023-10-12 RX ADMIN — Medication 400 MILLIGRAM(S): at 15:16

## 2023-10-12 RX ADMIN — Medication 50 MILLIGRAM(S): at 22:23

## 2023-10-12 RX ADMIN — ZINC OXIDE 1 APPLICATION(S): 200 OINTMENT TOPICAL at 05:52

## 2023-10-12 RX ADMIN — Medication 650 MILLIGRAM(S): at 17:30

## 2023-10-12 RX ADMIN — Medication 400 MILLIGRAM(S): at 06:35

## 2023-10-12 RX ADMIN — Medication 25 MILLIGRAM(S): at 20:12

## 2023-10-12 NOTE — PROGRESS NOTE ADULT - ASSESSMENT
89F with hypothyroidism, osteoarthritis, HLD, and HTN admitted for generalized weakness, confusion and poor oral intake found to have severe hypothyroidism  ( on admission) without myxedema coma     1. Hypothyroidism   Without symptoms of myxedema coma   Started on synthroid 88mcg (started 10/7)  Repeat TFTs in 4 weeks     2. Acute urinary retention, failed TOV  Chase catheter re-inserted 10/11   Evaluated by urology, chase to leg bag on dc with OP TOV   Started betanechol 25mg BID   Bowel regimen  AUR likely due to constipation and weakened bladder     3. Constipation   Miralax, senna, PT, OOB  Add lactulose    4. HLD  DC statin, no appreciable benefit at this time    5. Disposition  Family wishes to take pt home but pt appears significantly deconditioned  Pain control, PT evaluation today, will benefit from short term rehabilitation        89F with hypothyroidism, osteoarthritis, HLD, and HTN admitted for generalized weakness, confusion and poor oral intake found to have severe hypothyroidism  ( on admission) without myxedema coma     1. Hypothyroidism   Without symptoms of myxedema coma   Started on synthroid 88mcg (started 10/7)  Repeat TFTs in 4 weeks     2. Acute urinary retention, failed TOV  Chase catheter re-inserted 10/11   Evaluated by urology, chase to leg bag on dc with OP TOV   Started betanechol 25mg BID   Bowel regimen  AUR likely due to constipation and weakened bladder     3. HTN  BP as high as 180 systolic  Continue norvasc 10mg and add hydralazine 50mg TID   HR ranging 40-60bpm, coreg stopped    4. Constipation   Miralax, senna, PT, OOB  Add lactulose    5. HLD  DC statin, no appreciable benefit at this time    6. Disposition  Family wishes to take pt home but pt appears significantly deconditioned  Pain control, PT evaluation today, will benefit from short term rehabilitation

## 2023-10-12 NOTE — PHYSICAL THERAPY INITIAL EVALUATION ADULT - ADDITIONAL COMMENTS
Patient had been living alone and was able to care for herself, perform ADLs, and ambulate with SAC independently up until recently.  Plan is to return to niece's home and will have hospital bed.

## 2023-10-12 NOTE — PHYSICAL THERAPY INITIAL EVALUATION ADULT - LEVEL OF INDEPENDENCE: SIT/STAND, REHAB EVAL
S/w pt - pt is denied due to [facility] [place] costs is not >3% - pt needs $[phone] more to qualify  Pt will contact her insurance company to discuss alternatives to eliqu[employer]  Refused

## 2023-10-13 LAB
ANION GAP SERPL CALC-SCNC: 9 MMOL/L — SIGNIFICANT CHANGE UP (ref 5–17)
BUN SERPL-MCNC: 26 MG/DL — HIGH (ref 7–23)
CALCIUM SERPL-MCNC: 8.9 MG/DL — SIGNIFICANT CHANGE UP (ref 8.5–10.1)
CHLORIDE SERPL-SCNC: 103 MMOL/L — SIGNIFICANT CHANGE UP (ref 96–108)
CO2 SERPL-SCNC: 22 MMOL/L — SIGNIFICANT CHANGE UP (ref 22–31)
CREAT SERPL-MCNC: 1.3 MG/DL — SIGNIFICANT CHANGE UP (ref 0.5–1.3)
CULTURE RESULTS: SIGNIFICANT CHANGE UP
CULTURE RESULTS: SIGNIFICANT CHANGE UP
EGFR: 37 ML/MIN/1.73M2 — LOW
GLUCOSE SERPL-MCNC: 139 MG/DL — HIGH (ref 70–99)
HCT VFR BLD CALC: 29.4 % — LOW (ref 34.5–45)
HGB BLD-MCNC: 10.4 G/DL — LOW (ref 11.5–15.5)
MCHC RBC-ENTMCNC: 33.3 PG — SIGNIFICANT CHANGE UP (ref 27–34)
MCHC RBC-ENTMCNC: 35.4 GM/DL — SIGNIFICANT CHANGE UP (ref 32–36)
MCV RBC AUTO: 94.2 FL — SIGNIFICANT CHANGE UP (ref 80–100)
NRBC # BLD: 0 /100 WBCS — SIGNIFICANT CHANGE UP (ref 0–0)
PLATELET # BLD AUTO: 262 K/UL — SIGNIFICANT CHANGE UP (ref 150–400)
POTASSIUM SERPL-MCNC: 3.7 MMOL/L — SIGNIFICANT CHANGE UP (ref 3.5–5.3)
POTASSIUM SERPL-SCNC: 3.7 MMOL/L — SIGNIFICANT CHANGE UP (ref 3.5–5.3)
RBC # BLD: 3.12 M/UL — LOW (ref 3.8–5.2)
RBC # FLD: 14.4 % — SIGNIFICANT CHANGE UP (ref 10.3–14.5)
SODIUM SERPL-SCNC: 134 MMOL/L — LOW (ref 135–145)
SPECIMEN SOURCE: SIGNIFICANT CHANGE UP
SPECIMEN SOURCE: SIGNIFICANT CHANGE UP
WBC # BLD: 11.4 K/UL — HIGH (ref 3.8–10.5)
WBC # FLD AUTO: 11.4 K/UL — HIGH (ref 3.8–10.5)

## 2023-10-13 PROCEDURE — 99233 SBSQ HOSP IP/OBS HIGH 50: CPT

## 2023-10-13 RX ORDER — HYDRALAZINE HCL 50 MG
50 TABLET ORAL THREE TIMES A DAY
Refills: 0 | Status: DISCONTINUED | OUTPATIENT
Start: 2023-10-14 | End: 2023-10-16

## 2023-10-13 RX ORDER — TRAMADOL HYDROCHLORIDE 50 MG/1
50 TABLET ORAL THREE TIMES A DAY
Refills: 0 | Status: DISCONTINUED | OUTPATIENT
Start: 2023-10-13 | End: 2023-10-16

## 2023-10-13 RX ORDER — HYDRALAZINE HCL 50 MG
50 TABLET ORAL THREE TIMES A DAY
Refills: 0 | Status: DISCONTINUED | OUTPATIENT
Start: 2023-10-13 | End: 2023-10-13

## 2023-10-13 RX ORDER — AMLODIPINE BESYLATE 2.5 MG/1
10 TABLET ORAL DAILY
Refills: 0 | Status: DISCONTINUED | OUTPATIENT
Start: 2023-10-14 | End: 2023-10-16

## 2023-10-13 RX ORDER — SODIUM CHLORIDE 9 MG/ML
1000 INJECTION INTRAMUSCULAR; INTRAVENOUS; SUBCUTANEOUS
Refills: 0 | Status: DISCONTINUED | OUTPATIENT
Start: 2023-10-13 | End: 2023-10-14

## 2023-10-13 RX ADMIN — Medication 50 MILLIGRAM(S): at 22:20

## 2023-10-13 RX ADMIN — AMLODIPINE BESYLATE 10 MILLIGRAM(S): 2.5 TABLET ORAL at 06:23

## 2023-10-13 RX ADMIN — ZINC OXIDE 1 APPLICATION(S): 200 OINTMENT TOPICAL at 06:26

## 2023-10-13 RX ADMIN — Medication 25 MILLIGRAM(S): at 06:23

## 2023-10-13 RX ADMIN — HEPARIN SODIUM 5000 UNIT(S): 5000 INJECTION INTRAVENOUS; SUBCUTANEOUS at 22:07

## 2023-10-13 RX ADMIN — HEPARIN SODIUM 5000 UNIT(S): 5000 INJECTION INTRAVENOUS; SUBCUTANEOUS at 13:49

## 2023-10-13 RX ADMIN — Medication 50 MILLIGRAM(S): at 06:23

## 2023-10-13 RX ADMIN — Medication 400 MILLIGRAM(S): at 00:15

## 2023-10-13 RX ADMIN — TRAMADOL HYDROCHLORIDE 50 MILLIGRAM(S): 50 TABLET ORAL at 22:37

## 2023-10-13 RX ADMIN — TRAMADOL HYDROCHLORIDE 50 MILLIGRAM(S): 50 TABLET ORAL at 22:07

## 2023-10-13 RX ADMIN — HEPARIN SODIUM 5000 UNIT(S): 5000 INJECTION INTRAVENOUS; SUBCUTANEOUS at 06:23

## 2023-10-13 RX ADMIN — Medication 88 MICROGRAM(S): at 06:23

## 2023-10-13 RX ADMIN — Medication 25 MILLIGRAM(S): at 17:54

## 2023-10-13 RX ADMIN — Medication 50 MILLIGRAM(S): at 13:49

## 2023-10-13 NOTE — CASE MANAGEMENT PROGRESS NOTE - NSCMPROGRESSNOTE_GEN_ALL_CORE
Discussed on rounds this am.  Pt is anticipated for home with her niece when medically stable.  Met with shamar at bedside, she states the hospital bed was delivered, she is in agreement with transportation arrangements to be made by CM ( jade vs lette) when medically stable ( she is aware of fee for services for lette). She remains in agreement for CM to arrange [facility] upon transition to home with request for HHA and nursing and SW for long term planning.  She is aware a CM will remain available through hospitalization.  Shamra states she d/w MD this am, anticipating d/c on Monday, will confirm with MD.

## 2023-10-13 NOTE — PROGRESS NOTE ADULT - ASSESSMENT
89F with hypothyroidism, osteoarthritis, HLD, and HTN admitted for generalized weakness, confusion and poor oral intake found to have severe hypothyroidism  ( on admission) without myxedema coma     1. General aches and pain  2. Generalized weakness   Low suspicion for viral syndrome   Vitals and blood work reassuring   Pain control with PRN tylenol, ibuprofen and tramadol   OOB to chair, PT/OT  Discussed with niece, prefers dismissal home when medically stable over short term rehabilitation    3. Hypothyroidism   Without symptoms of myxedema coma   Started on synthroid 88mcg (started 10/7)  Repeat TFTs in 4 weeks   Endocrine eval noted     4. Acute urinary retention, failed TOV  Chase catheter re-inserted 10/11   AUR likely due to constipation and weakened bladder   Evaluated by urology, Dr. Crowder chase to leg bag on dc with OP TOV   Started betanechol 25mg BID   Bowel regimen    5. HTN  Blood pressure slowly improving with addition of hydralazine yesterday  Continue norvasc 10mg and hydralazine 50mg TID. Dose adjust as appropriate for goal BP <150/90mmHg  HR ranging 40-60bpm, coreg stopped    6. Constipation   Miralax, senna, PT, OOB    7. HLD  DC statin, no appreciable benefit at this time    8. Disposition  Family wishes to take pt home but pt appears deconditioned  PT eval for further determination

## 2023-10-14 LAB
ANION GAP SERPL CALC-SCNC: 8 MMOL/L — SIGNIFICANT CHANGE UP (ref 5–17)
BUN SERPL-MCNC: 35 MG/DL — HIGH (ref 7–23)
CALCIUM SERPL-MCNC: 8.6 MG/DL — SIGNIFICANT CHANGE UP (ref 8.5–10.1)
CHLORIDE SERPL-SCNC: 107 MMOL/L — SIGNIFICANT CHANGE UP (ref 96–108)
CO2 SERPL-SCNC: 24 MMOL/L — SIGNIFICANT CHANGE UP (ref 22–31)
CREAT SERPL-MCNC: 1.2 MG/DL — SIGNIFICANT CHANGE UP (ref 0.5–1.3)
EGFR: 41 ML/MIN/1.73M2 — LOW
GLUCOSE SERPL-MCNC: 124 MG/DL — HIGH (ref 70–99)
HCT VFR BLD CALC: 28 % — LOW (ref 34.5–45)
HGB BLD-MCNC: 9.5 G/DL — LOW (ref 11.5–15.5)
MCHC RBC-ENTMCNC: 33.1 PG — SIGNIFICANT CHANGE UP (ref 27–34)
MCHC RBC-ENTMCNC: 33.9 GM/DL — SIGNIFICANT CHANGE UP (ref 32–36)
MCV RBC AUTO: 97.6 FL — SIGNIFICANT CHANGE UP (ref 80–100)
NRBC # BLD: 0 /100 WBCS — SIGNIFICANT CHANGE UP (ref 0–0)
PLATELET # BLD AUTO: 277 K/UL — SIGNIFICANT CHANGE UP (ref 150–400)
POTASSIUM SERPL-MCNC: 3.6 MMOL/L — SIGNIFICANT CHANGE UP (ref 3.5–5.3)
POTASSIUM SERPL-SCNC: 3.6 MMOL/L — SIGNIFICANT CHANGE UP (ref 3.5–5.3)
RBC # BLD: 2.87 M/UL — LOW (ref 3.8–5.2)
RBC # FLD: 15 % — HIGH (ref 10.3–14.5)
SODIUM SERPL-SCNC: 139 MMOL/L — SIGNIFICANT CHANGE UP (ref 135–145)
WBC # BLD: 11.93 K/UL — HIGH (ref 3.8–10.5)
WBC # FLD AUTO: 11.93 K/UL — HIGH (ref 3.8–10.5)

## 2023-10-14 PROCEDURE — 99232 SBSQ HOSP IP/OBS MODERATE 35: CPT

## 2023-10-14 RX ORDER — SODIUM CHLORIDE 9 MG/ML
1000 INJECTION INTRAMUSCULAR; INTRAVENOUS; SUBCUTANEOUS
Refills: 0 | Status: DISCONTINUED | OUTPATIENT
Start: 2023-10-14 | End: 2023-10-16

## 2023-10-14 RX ADMIN — AMLODIPINE BESYLATE 10 MILLIGRAM(S): 2.5 TABLET ORAL at 05:39

## 2023-10-14 RX ADMIN — Medication 400 MILLIGRAM(S): at 14:49

## 2023-10-14 RX ADMIN — SODIUM CHLORIDE 60 MILLILITER(S): 9 INJECTION INTRAMUSCULAR; INTRAVENOUS; SUBCUTANEOUS at 18:43

## 2023-10-14 RX ADMIN — TRAMADOL HYDROCHLORIDE 50 MILLIGRAM(S): 50 TABLET ORAL at 10:27

## 2023-10-14 RX ADMIN — Medication 25 MILLIGRAM(S): at 05:38

## 2023-10-14 RX ADMIN — HEPARIN SODIUM 5000 UNIT(S): 5000 INJECTION INTRAVENOUS; SUBCUTANEOUS at 22:30

## 2023-10-14 RX ADMIN — HEPARIN SODIUM 5000 UNIT(S): 5000 INJECTION INTRAVENOUS; SUBCUTANEOUS at 14:36

## 2023-10-14 RX ADMIN — Medication 400 MILLIGRAM(S): at 15:10

## 2023-10-14 RX ADMIN — Medication 50 MILLIGRAM(S): at 14:35

## 2023-10-14 RX ADMIN — Medication 88 MICROGRAM(S): at 05:39

## 2023-10-14 RX ADMIN — Medication 25 MILLIGRAM(S): at 18:44

## 2023-10-14 RX ADMIN — Medication 50 MILLIGRAM(S): at 22:30

## 2023-10-14 RX ADMIN — Medication 50 MILLIGRAM(S): at 05:38

## 2023-10-14 RX ADMIN — HEPARIN SODIUM 5000 UNIT(S): 5000 INJECTION INTRAVENOUS; SUBCUTANEOUS at 05:39

## 2023-10-14 RX ADMIN — TRAMADOL HYDROCHLORIDE 50 MILLIGRAM(S): 50 TABLET ORAL at 11:00

## 2023-10-14 NOTE — DIETITIAN INITIAL EVALUATION ADULT - PERTINENT LABORATORY DATA
10-14    139  |  107  |  35<H>  ----------------------------<  124<H>  3.6   |  24  |  1.20    Ca    8.6      14 Oct 2023 08:22

## 2023-10-14 NOTE — DIETITIAN INITIAL EVALUATION ADULT - PERTINENT MEDS FT
MEDICATIONS  (STANDING):  amLODIPine   Tablet 10 milliGRAM(s) Oral daily  bethanechol 25 milliGRAM(s) Oral two times a day  heparin   Injectable 5000 Unit(s) SubCutaneous every 8 hours  hydrALAZINE 50 milliGRAM(s) Oral three times a day  influenza  Vaccine (HIGH DOSE) 0.7 milliLiter(s) IntraMuscular once  lactulose Syrup 20 Gram(s) Oral once  levothyroxine 88 MICROGram(s) Oral daily  polyethylene glycol 3350 17 Gram(s) Oral two times a day  senna 2 Tablet(s) Oral at bedtime  senna 2 Tablet(s) Oral once  sodium chloride 0.9%. 1000 milliLiter(s) (75 mL/Hr) IV Continuous <Continuous>  zinc oxide 40% Paste 1 Application(s) Topical two times a day    MEDICATIONS  (PRN):  acetaminophen     Tablet .. 650 milliGRAM(s) Oral every 6 hours PRN Moderate Pain (4 - 6)  ibuprofen  Tablet. 400 milliGRAM(s) Oral every 8 hours PRN Mild Pain (1 - 3)  melatonin 3 milliGRAM(s) Oral at bedtime PRN Insomnia  traMADol 50 milliGRAM(s) Oral three times a day PRN Severe Pain (7 - 10)

## 2023-10-14 NOTE — DIETITIAN INITIAL EVALUATION ADULT - ORAL INTAKE PTA/DIET HISTORY
patient seen sleeping in bed. family at bedside. niece states up until ~ 2 months ago patient was preparing meals at home and patient lives by herself. appetite lower over last 2 months   patient with [facility] in chart DNR DNI  per niece who stays at bedside patient with improved PO today ate cream of wheat pancakes and 1/2 ensure  education deferred at this time   yesterday poor PO . has had ensure supplement at times  allergy to shellfish entered in medical record

## 2023-10-14 NOTE — DIETITIAN INITIAL EVALUATION ADULT - OTHER INFO
Reason for Admission: weakness  History of Present Illness:   89yF PMHx of hypothyroidism, osteoarthritis, HLD, and HTN presenting with weakness since Sunday, Patient and patient's niece state the patient has been experiencing weakness, fatigue, confusion, and decreased appetite which has been progressing since Sunday. The patient also had difficulty standing and walking and was receiving help from a friend/neighbor. In addition, the patient had not taken most of her medications since Sunday, including levothyroxine  weight 95# height not known  10/13 BM on bowel regiment

## 2023-10-14 NOTE — DIETITIAN INITIAL EVALUATION ADULT - PROBLEM SELECTOR PLAN 7
Patient uncertain about names and doses of several medications. Uses 2 pharmacies  - [facility]: [phone]  - [facility]: [phone]  - primary team to complete medication reconciliation

## 2023-10-14 NOTE — DIETITIAN INITIAL EVALUATION ADULT - FACTORS AFF FOOD INTAKE
weakness low PO PTA, speech recommends minced and moist thin liquids/difficulty swallowing/other (specify)

## 2023-10-14 NOTE — DIETITIAN INITIAL EVALUATION ADULT - PROBLEM SELECTOR PLAN 3
CT A/P: Moderate volume of stool distending the rectum and distended urinary bladder  - UA negative for nitrite and leukocyte esterase  - urinary retention may be secondary to constipation  - dulcolax suppository x1  - bowel regimen (miralax and senna)  - desitin ordered for vulvovaginal redness  - chase placed

## 2023-10-14 NOTE — DIETITIAN INITIAL EVALUATION ADULT - PROBLEM SELECTOR PLAN 1
- likely in the setting of uncontrolled hypothyroidism  - on admission  - constipation and urinary retention  - s/p 1L NS,  chase placement in ED  - endocrine (Dr. Perlman) consulted

## 2023-10-15 LAB
ANION GAP SERPL CALC-SCNC: 9 MMOL/L — SIGNIFICANT CHANGE UP (ref 5–17)
BUN SERPL-MCNC: 40 MG/DL — HIGH (ref 7–23)
CALCIUM SERPL-MCNC: 8.4 MG/DL — LOW (ref 8.5–10.1)
CHLORIDE SERPL-SCNC: 109 MMOL/L — HIGH (ref 96–108)
CO2 SERPL-SCNC: 23 MMOL/L — SIGNIFICANT CHANGE UP (ref 22–31)
CREAT SERPL-MCNC: 1.2 MG/DL — SIGNIFICANT CHANGE UP (ref 0.5–1.3)
EGFR: 41 ML/MIN/1.73M2 — LOW
GLUCOSE SERPL-MCNC: 136 MG/DL — HIGH (ref 70–99)
HCT VFR BLD CALC: 24.5 % — LOW (ref 34.5–45)
HGB BLD-MCNC: 8.4 G/DL — LOW (ref 11.5–15.5)
MCHC RBC-ENTMCNC: 33.7 PG — SIGNIFICANT CHANGE UP (ref 27–34)
MCHC RBC-ENTMCNC: 34.3 GM/DL — SIGNIFICANT CHANGE UP (ref 32–36)
MCV RBC AUTO: 98.4 FL — SIGNIFICANT CHANGE UP (ref 80–100)
NRBC # BLD: 0 /100 WBCS — SIGNIFICANT CHANGE UP (ref 0–0)
PLATELET # BLD AUTO: 234 K/UL — SIGNIFICANT CHANGE UP (ref 150–400)
POTASSIUM SERPL-MCNC: 3.5 MMOL/L — SIGNIFICANT CHANGE UP (ref 3.5–5.3)
POTASSIUM SERPL-SCNC: 3.5 MMOL/L — SIGNIFICANT CHANGE UP (ref 3.5–5.3)
RBC # BLD: 2.49 M/UL — LOW (ref 3.8–5.2)
RBC # FLD: 15.6 % — HIGH (ref 10.3–14.5)
SODIUM SERPL-SCNC: 141 MMOL/L — SIGNIFICANT CHANGE UP (ref 135–145)
WBC # BLD: 11.3 K/UL — HIGH (ref 3.8–10.5)
WBC # FLD AUTO: 11.3 K/UL — HIGH (ref 3.8–10.5)

## 2023-10-15 PROCEDURE — 99232 SBSQ HOSP IP/OBS MODERATE 35: CPT

## 2023-10-15 RX ORDER — LEVOTHYROXINE SODIUM 125 MCG
1 TABLET ORAL
Refills: 0 | DISCHARGE

## 2023-10-15 RX ORDER — LEVOTHYROXINE SODIUM 125 MCG
1 TABLET ORAL
Qty: 30 | Refills: 0
Start: 2023-10-15 | End: 2023-11-13

## 2023-10-15 RX ORDER — AMLODIPINE BESYLATE 2.5 MG/1
1 TABLET ORAL
Qty: 0 | Refills: 0 | DISCHARGE
Start: 2023-10-15

## 2023-10-15 RX ORDER — CARVEDILOL PHOSPHATE 80 MG/1
1 CAPSULE, EXTENDED RELEASE ORAL
Refills: 0 | DISCHARGE

## 2023-10-15 RX ORDER — ATORVASTATIN CALCIUM 80 MG/1
1 TABLET, FILM COATED ORAL
Refills: 0 | DISCHARGE

## 2023-10-15 RX ORDER — SENNA PLUS 8.6 MG/1
2 TABLET ORAL
Qty: 0 | Refills: 0 | DISCHARGE
Start: 2023-10-15

## 2023-10-15 RX ORDER — LEVOTHYROXINE SODIUM 125 MCG
1 TABLET ORAL
Qty: 0 | Refills: 0 | DISCHARGE
Start: 2023-10-15

## 2023-10-15 RX ORDER — HYDRALAZINE HCL 50 MG
1 TABLET ORAL
Qty: 90 | Refills: 0
Start: 2023-10-15 | End: 2023-11-13

## 2023-10-15 RX ORDER — AMLODIPINE BESYLATE 2.5 MG/1
1 TABLET ORAL
Refills: 0 | DISCHARGE

## 2023-10-15 RX ORDER — IBUPROFEN 200 MG
1 TABLET ORAL
Qty: 0 | Refills: 0 | DISCHARGE
Start: 2023-10-15

## 2023-10-15 RX ORDER — ACETAMINOPHEN 500 MG
2 TABLET ORAL
Qty: 0 | Refills: 0 | DISCHARGE
Start: 2023-10-15

## 2023-10-15 RX ORDER — TRAMADOL HYDROCHLORIDE 50 MG/1
1 TABLET ORAL
Qty: 21 | Refills: 0
Start: 2023-10-15 | End: 2023-10-21

## 2023-10-15 RX ORDER — IPRATROPIUM/ALBUTEROL SULFATE 18-103MCG
3 AEROSOL WITH ADAPTER (GRAM) INHALATION EVERY 6 HOURS
Refills: 0 | Status: DISCONTINUED | OUTPATIENT
Start: 2023-10-15 | End: 2023-10-16

## 2023-10-15 RX ORDER — POLYETHYLENE GLYCOL 3350 17 G/17G
17 POWDER, FOR SOLUTION ORAL
Qty: 0 | Refills: 0 | DISCHARGE
Start: 2023-10-15

## 2023-10-15 RX ORDER — BETHANECHOL CHLORIDE 25 MG
1 TABLET ORAL
Qty: 60 | Refills: 0
Start: 2023-10-15 | End: 2023-11-13

## 2023-10-15 RX ADMIN — Medication 400 MILLIGRAM(S): at 16:24

## 2023-10-15 RX ADMIN — Medication 50 MILLIGRAM(S): at 22:33

## 2023-10-15 RX ADMIN — HEPARIN SODIUM 5000 UNIT(S): 5000 INJECTION INTRAVENOUS; SUBCUTANEOUS at 13:52

## 2023-10-15 RX ADMIN — Medication 25 MILLIGRAM(S): at 17:42

## 2023-10-15 RX ADMIN — AMLODIPINE BESYLATE 10 MILLIGRAM(S): 2.5 TABLET ORAL at 06:17

## 2023-10-15 RX ADMIN — Medication 650 MILLIGRAM(S): at 13:00

## 2023-10-15 RX ADMIN — Medication 3 MILLILITER(S): at 16:02

## 2023-10-15 RX ADMIN — HEPARIN SODIUM 5000 UNIT(S): 5000 INJECTION INTRAVENOUS; SUBCUTANEOUS at 22:33

## 2023-10-15 RX ADMIN — Medication 400 MILLIGRAM(S): at 08:25

## 2023-10-15 RX ADMIN — Medication 650 MILLIGRAM(S): at 12:11

## 2023-10-15 RX ADMIN — Medication 400 MILLIGRAM(S): at 17:20

## 2023-10-15 RX ADMIN — Medication 400 MILLIGRAM(S): at 09:20

## 2023-10-15 RX ADMIN — Medication 25 MILLIGRAM(S): at 06:17

## 2023-10-15 RX ADMIN — ZINC OXIDE 1 APPLICATION(S): 200 OINTMENT TOPICAL at 17:44

## 2023-10-15 RX ADMIN — Medication 50 MILLIGRAM(S): at 06:16

## 2023-10-15 RX ADMIN — HEPARIN SODIUM 5000 UNIT(S): 5000 INJECTION INTRAVENOUS; SUBCUTANEOUS at 06:16

## 2023-10-15 RX ADMIN — Medication 88 MICROGRAM(S): at 06:17

## 2023-10-15 RX ADMIN — Medication 50 MILLIGRAM(S): at 13:52

## 2023-10-15 NOTE — CASE MANAGEMENT PROGRESS NOTE - NSCMPROGRESSNOTE_GEN_ALL_CORE
As per Dr Miller order prepare for discharge, patient is for possible transition tomorrow.  As per Dr KATERINA Miller order prepare for discharge, patient is for possible transition tomorrow.

## 2023-10-16 VITALS
DIASTOLIC BLOOD PRESSURE: 59 MMHG | TEMPERATURE: 98 F | SYSTOLIC BLOOD PRESSURE: 116 MMHG | OXYGEN SATURATION: 95 % | RESPIRATION RATE: 17 BRPM | HEART RATE: 82 BPM

## 2023-10-16 LAB
ANION GAP SERPL CALC-SCNC: 8 MMOL/L — SIGNIFICANT CHANGE UP (ref 5–17)
BUN SERPL-MCNC: 35 MG/DL — HIGH (ref 7–23)
CALCIUM SERPL-MCNC: 8.1 MG/DL — LOW (ref 8.5–10.1)
CHLORIDE SERPL-SCNC: 110 MMOL/L — HIGH (ref 96–108)
CO2 SERPL-SCNC: 23 MMOL/L — SIGNIFICANT CHANGE UP (ref 22–31)
CREAT SERPL-MCNC: 1 MG/DL — SIGNIFICANT CHANGE UP (ref 0.5–1.3)
EGFR: 51 ML/MIN/1.73M2 — LOW
GLUCOSE SERPL-MCNC: 115 MG/DL — HIGH (ref 70–99)
HCT VFR BLD CALC: 21.1 % — LOW (ref 34.5–45)
HCT VFR BLD CALC: 22 % — LOW (ref 34.5–45)
HGB BLD-MCNC: 7.2 G/DL — LOW (ref 11.5–15.5)
HGB BLD-MCNC: 7.5 G/DL — LOW (ref 11.5–15.5)
MCHC RBC-ENTMCNC: 33.5 PG — SIGNIFICANT CHANGE UP (ref 27–34)
MCHC RBC-ENTMCNC: 33.6 PG — SIGNIFICANT CHANGE UP (ref 27–34)
MCHC RBC-ENTMCNC: 34.1 GM/DL — SIGNIFICANT CHANGE UP (ref 32–36)
MCHC RBC-ENTMCNC: 34.1 GM/DL — SIGNIFICANT CHANGE UP (ref 32–36)
MCV RBC AUTO: 98.1 FL — SIGNIFICANT CHANGE UP (ref 80–100)
MCV RBC AUTO: 98.7 FL — SIGNIFICANT CHANGE UP (ref 80–100)
NRBC # BLD: 0 /100 WBCS — SIGNIFICANT CHANGE UP (ref 0–0)
NRBC # BLD: 0 /100 WBCS — SIGNIFICANT CHANGE UP (ref 0–0)
PLATELET # BLD AUTO: 225 K/UL — SIGNIFICANT CHANGE UP (ref 150–400)
PLATELET # BLD AUTO: 245 K/UL — SIGNIFICANT CHANGE UP (ref 150–400)
POTASSIUM SERPL-MCNC: 3.4 MMOL/L — LOW (ref 3.5–5.3)
POTASSIUM SERPL-SCNC: 3.4 MMOL/L — LOW (ref 3.5–5.3)
RBC # BLD: 2.15 M/UL — LOW (ref 3.8–5.2)
RBC # BLD: 2.23 M/UL — LOW (ref 3.8–5.2)
RBC # FLD: 15.8 % — HIGH (ref 10.3–14.5)
RBC # FLD: 15.9 % — HIGH (ref 10.3–14.5)
SODIUM SERPL-SCNC: 141 MMOL/L — SIGNIFICANT CHANGE UP (ref 135–145)
WBC # BLD: 11.16 K/UL — HIGH (ref 3.8–10.5)
WBC # BLD: 12.62 K/UL — HIGH (ref 3.8–10.5)
WBC # FLD AUTO: 11.16 K/UL — HIGH (ref 3.8–10.5)
WBC # FLD AUTO: 12.62 K/UL — HIGH (ref 3.8–10.5)

## 2023-10-16 PROCEDURE — 99239 HOSP IP/OBS DSCHRG MGMT >30: CPT

## 2023-10-16 RX ORDER — POTASSIUM CHLORIDE 20 MEQ
40 PACKET (EA) ORAL ONCE
Refills: 0 | Status: COMPLETED | OUTPATIENT
Start: 2023-10-16 | End: 2023-10-16

## 2023-10-16 RX ORDER — PANTOPRAZOLE SODIUM 20 MG/1
1 TABLET, DELAYED RELEASE ORAL
Qty: 60 | Refills: 0
Start: 2023-10-16 | End: 2023-11-14

## 2023-10-16 RX ORDER — PANTOPRAZOLE SODIUM 20 MG/1
40 TABLET, DELAYED RELEASE ORAL
Refills: 0 | Status: DISCONTINUED | OUTPATIENT
Start: 2023-10-16 | End: 2023-10-16

## 2023-10-16 RX ADMIN — Medication 400 MILLIGRAM(S): at 07:46

## 2023-10-16 RX ADMIN — Medication 650 MILLIGRAM(S): at 11:09

## 2023-10-16 RX ADMIN — Medication 50 MILLIGRAM(S): at 06:55

## 2023-10-16 RX ADMIN — Medication 40 MILLIEQUIVALENT(S): at 14:15

## 2023-10-16 RX ADMIN — HEPARIN SODIUM 5000 UNIT(S): 5000 INJECTION INTRAVENOUS; SUBCUTANEOUS at 14:16

## 2023-10-16 RX ADMIN — Medication 400 MILLIGRAM(S): at 06:57

## 2023-10-16 RX ADMIN — Medication 50 MILLIGRAM(S): at 14:17

## 2023-10-16 RX ADMIN — Medication 650 MILLIGRAM(S): at 10:33

## 2023-10-16 RX ADMIN — AMLODIPINE BESYLATE 10 MILLIGRAM(S): 2.5 TABLET ORAL at 06:56

## 2023-10-16 RX ADMIN — HEPARIN SODIUM 5000 UNIT(S): 5000 INJECTION INTRAVENOUS; SUBCUTANEOUS at 06:55

## 2023-10-16 RX ADMIN — Medication 88 MICROGRAM(S): at 06:56

## 2023-10-16 RX ADMIN — Medication 25 MILLIGRAM(S): at 06:56

## 2023-10-16 NOTE — CASE MANAGEMENT PROGRESS NOTE - NSCMPROGRESSNOTE_GEN_ALL_CORE
Pt Hgb 7.5, Hct 22. Per Dr Miller no transition home today and possible transfusion. CM to remain available

## 2023-10-16 NOTE — CASE MANAGEMENT PROGRESS NOTE - NSCMPROGRESSNOTE_GEN_ALL_CORE
Spoke w MD no transfusion at this time, pt to be transitioned home today by ambulance. Niece aware of possible charge for ambulance. Referral to [facility] [phone] with SOC anticipated with 24-72 hours. CM to remain available Spoke w MD no transfusion at this time, pt to be transitioned home today by ambulance. Niece aware of possible charge for ambulance. Referral to [facility] [phone] with SOC anticipated with 24-72 hours. Ambulance set up for 4 pm. CM to remain available.  Spoke w MD no transfusion at this time, pt to be transitioned home today by ambulance. Niece aware of possible charge for ambulance. Referral to [facility] [phone] with SOC anticipated with 24-72 hours. Ambulance set up for 4 pm. Discussed plan with RN and MD. CM to remain available.

## 2023-10-16 NOTE — PROGRESS NOTE ADULT - PROVIDER SPECIALTY LIST ADULT
Hospitalist
Hospitalist
Urology
Endocrinology
Internal Medicine
Hospitalist
Hospitalist
Internal Medicine
Hospitalist
Internal Medicine
Hospitalist

## 2023-10-16 NOTE — PROGRESS NOTE ADULT - PROBLEM SELECTOR PROBLEM 1
General weakness
General weakness
Hypothyroid
General weakness

## 2023-10-16 NOTE — PROGRESS NOTE ADULT - SUBJECTIVE AND OBJECTIVE BOX
Patient is a 89y old  Female who presents with a chief complaint of weakness (15 Oct 2023 12:30)      INTERVAL HPI/OVERNIGHT EVENTS: Patient seen and examined at bedside. No overnight events. Hemoglobin 7.2 this AM.     MEDICATIONS  (STANDING):  amLODIPine   Tablet 10 milliGRAM(s) Oral daily  bethanechol 25 milliGRAM(s) Oral two times a day  heparin   Injectable 5000 Unit(s) SubCutaneous every 8 hours  hydrALAZINE 50 milliGRAM(s) Oral three times a day  influenza  Vaccine (HIGH DOSE) 0.7 milliLiter(s) IntraMuscular once  lactulose Syrup 20 Gram(s) Oral once  levothyroxine 88 MICROGram(s) Oral daily  polyethylene glycol 3350 17 Gram(s) Oral two times a day  senna 2 Tablet(s) Oral once  senna 2 Tablet(s) Oral at bedtime  sodium chloride 0.9%. 1000 milliLiter(s) (60 mL/Hr) IV Continuous <Continuous>  zinc oxide 40% Paste 1 Application(s) Topical two times a day    MEDICATIONS  (PRN):  acetaminophen     Tablet .. 650 milliGRAM(s) Oral every 6 hours PRN Moderate Pain (4 - 6)  albuterol/ipratropium for Nebulization 3 milliLiter(s) Nebulizer every 6 hours PRN Shortness of Breath and/or Wheezing  ibuprofen  Tablet. 400 milliGRAM(s) Oral every 8 hours PRN Mild Pain (1 - 3)  melatonin 3 milliGRAM(s) Oral at bedtime PRN Insomnia  traMADol 50 milliGRAM(s) Oral three times a day PRN Severe Pain (7 - 10)      Allergies    No Known Drug Allergies  shellfish (Unknown)    Intolerances    codeine (Nausea)      REVIEW OF SYSTEMS:  CONSTITUTIONAL: No fever or chills  HEENT:  No headache, no sore throat  RESPIRATORY: No cough, wheezing, or shortness of breath  CARDIOVASCULAR: No chest pain, palpitations  GASTROINTESTINAL: No abd pain, nausea, vomiting, or diarrhea  GENITOURINARY: No dysuria, frequency, or hematuria  NEUROLOGICAL: no focal weakness or dizziness  MUSCULOSKELETAL: no myalgias     Vital Signs Last 24 Hrs  T(C): 36.9 (16 Oct 2023 04:47), Max: 36.9 (15 Oct 2023 20:48)  T(F): 98.4 (16 Oct 2023 04:47), Max: 98.5 (15 Oct 2023 20:48)  HR: 75 (16 Oct 2023 04:47) (74 - 90)  BP: 142/57 (16 Oct 2023 04:47) (127/52 - 142/57)  BP(mean): --  RR: 17 (16 Oct 2023 04:47) (17 - 17)  SpO2: 93% (16 Oct 2023 04:47) (92% - 94%)    Parameters below as of 16 Oct 2023 04:47  Patient On (Oxygen Delivery Method): room air        PHYSICAL EXAM:  GENERAL: NAD  HEENT:  anicteric, moist mucous membranes  CHEST/LUNG:  CTA b/l, no rales, wheezes, or rhonchi  HEART:  RRR, S1, S2  ABDOMEN:  BS+, soft, nontender, nondistended  EXTREMITIES: no edema, cyanosis, or calf tenderness  NERVOUS SYSTEM: awake, alert    LABS:                        7.2    11.16 )-----------( 225      ( 16 Oct 2023 06:45 )             21.1     CBC Full  -  ( 16 Oct 2023 06:45 )  WBC Count : 11.16 K/uL  Hemoglobin : 7.2 g/dL  Hematocrit : 21.1 %  Platelet Count - Automated : 225 K/uL  Mean Cell Volume : 98.1 fl  Mean Cell Hemoglobin : 33.5 pg  Mean Cell Hemoglobin Concentration : 34.1 gm/dL  Auto Neutrophil # : x  Auto Lymphocyte # : x  Auto Monocyte # : x  Auto Eosinophil # : x  Auto Basophil # : x  Auto Neutrophil % : x  Auto Lymphocyte % : x  Auto Monocyte % : x  Auto Eosinophil % : x  Auto Basophil % : x    16 Oct 2023 06:45    141    |  110    |  35     ----------------------------<  115    3.4     |  23     |  1.00     Ca    8.1        16 Oct 2023 06:45        Urinalysis Basic - ( 16 Oct 2023 06:45 )    Color: x / Appearance: x / SG: x / pH: x  Gluc: 115 mg/dL / Ketone: x  / Bili: x / Urobili: x   Blood: x / Protein: x / Nitrite: x   Leuk Esterase: x / RBC: x / WBC x   Sq Epi: x / Non Sq Epi: x / Bacteria: x      CAPILLARY BLOOD GLUCOSE              RADIOLOGY & ADDITIONAL TESTS:    Personally reviewed.     Consultant(s) Notes Reviewed:  [x] YES  [ ] NO    
Patient is a 89y old  Female who presents with a chief complaint of weakness (10 Oct 2023 16:08)        INTERVAL HPI/OVERNIGHT EVENTS:    MEDICATIONS  (STANDING):  amLODIPine   Tablet 5 milliGRAM(s) Oral daily  atorvastatin 20 milliGRAM(s) Oral at bedtime  carvedilol 6.25 milliGRAM(s) Oral every 12 hours  heparin   Injectable 5000 Unit(s) SubCutaneous every 8 hours  influenza  Vaccine (HIGH DOSE) 0.7 milliLiter(s) IntraMuscular once  levothyroxine 88 MICROGram(s) Oral daily  polyethylene glycol 3350 17 Gram(s) Oral two times a day  senna 2 Tablet(s) Oral at bedtime  zinc oxide 40% Paste 1 Application(s) Topical two times a day    MEDICATIONS  (PRN):  ibuprofen  Tablet. 400 milliGRAM(s) Oral every 8 hours PRN Mild Pain (1 - 3)  melatonin 3 milliGRAM(s) Oral at bedtime PRN Insomnia      Allergies    No Known Allergies    Intolerances    codeine (Nausea)        Vital Signs Last 24 Hrs  T(C): 36.6 (11 Oct 2023 05:18), Max: 36.9 (10 Oct 2023 20:32)  T(F): 97.9 (11 Oct 2023 05:18), Max: 98.4 (10 Oct 2023 20:32)  HR: 49 (11 Oct 2023 05:18) (49 - 76)  BP: 185/68 (11 Oct 2023 05:18) (160/56 - 185/81)  BP(mean): --  RR: 17 (11 Oct 2023 05:18) (17 - 17)  SpO2: 93% (11 Oct 2023 05:18) (93% - 96%)    Parameters below as of 11 Oct 2023 05:18  Patient On (Oxygen Delivery Method): room air        Respiratory: CTA B/L  CV: RRR, S1S2, no murmurs, rubs or gallops  Abdominal: Soft, NT, ND +BS, Last BM  Extremities: No edema, + peripheral pulses    LABS:          CAPILLARY BLOOD GLUCOSE              RADIOLOGY & ADDITIONAL TESTS:  
Patient is a 89y old  Female who presents with a chief complaint of weakness (14 Oct 2023 17:47)      INTERVAL HPI/OVERNIGHT EVENTS: Patient seen and examined at bedside. No overnight events. Patient's daughter at bedside. She would like to take her home.     MEDICATIONS  (STANDING):  amLODIPine   Tablet 10 milliGRAM(s) Oral daily  bethanechol 25 milliGRAM(s) Oral two times a day  heparin   Injectable 5000 Unit(s) SubCutaneous every 8 hours  hydrALAZINE 50 milliGRAM(s) Oral three times a day  influenza  Vaccine (HIGH DOSE) 0.7 milliLiter(s) IntraMuscular once  lactulose Syrup 20 Gram(s) Oral once  levothyroxine 88 MICROGram(s) Oral daily  polyethylene glycol 3350 17 Gram(s) Oral two times a day  senna 2 Tablet(s) Oral once  senna 2 Tablet(s) Oral at bedtime  sodium chloride 0.9%. 1000 milliLiter(s) (60 mL/Hr) IV Continuous <Continuous>  zinc oxide 40% Paste 1 Application(s) Topical two times a day    MEDICATIONS  (PRN):  acetaminophen     Tablet .. 650 milliGRAM(s) Oral every 6 hours PRN Moderate Pain (4 - 6)  ibuprofen  Tablet. 400 milliGRAM(s) Oral every 8 hours PRN Mild Pain (1 - 3)  melatonin 3 milliGRAM(s) Oral at bedtime PRN Insomnia  traMADol 50 milliGRAM(s) Oral three times a day PRN Severe Pain (7 - 10)      Allergies    No Known Drug Allergies  shellfish (Unknown)    Intolerances    codeine (Nausea)      REVIEW OF SYSTEMS:  CONSTITUTIONAL: No fever or chills  HEENT:  No headache, no sore throat  RESPIRATORY: No cough, wheezing, or shortness of breath  CARDIOVASCULAR: No chest pain, palpitations  GASTROINTESTINAL: No abd pain, nausea, vomiting, or diarrhea  GENITOURINARY: No dysuria, frequency, or hematuria  NEUROLOGICAL: no focal weakness or dizziness  MUSCULOSKELETAL: no myalgias     Vital Signs Last 24 Hrs  T(C): 36.7 (15 Oct 2023 12:18), Max: 36.8 (15 Oct 2023 05:03)  T(F): 98.1 (15 Oct 2023 12:18), Max: 98.2 (15 Oct 2023 05:03)  HR: 78 (15 Oct 2023 12:18) (67 - 78)  BP: 131/63 (15 Oct 2023 12:18) (131/63 - 155/61)  BP(mean): --  RR: 17 (15 Oct 2023 12:18) (17 - 17)  SpO2: 94% (15 Oct 2023 12:18) (93% - 98%)    Parameters below as of 15 Oct 2023 12:18  Patient On (Oxygen Delivery Method): room air        PHYSICAL EXAM:  GENERAL: NAD  HEENT:  anicteric, moist mucous membranes  CHEST/LUNG:  CTA b/l, no rales, wheezes, or rhonchi  HEART:  RRR, S1, S2  ABDOMEN:  BS+, soft, nontender, nondistended  EXTREMITIES: no edema, cyanosis, or calf tenderness  SKIN: warm, dry    LABS:                        8.4    11.30 )-----------( 234      ( 15 Oct 2023 09:30 )             24.5     CBC Full  -  ( 15 Oct 2023 09:30 )  WBC Count : 11.30 K/uL  Hemoglobin : 8.4 g/dL  Hematocrit : 24.5 %  Platelet Count - Automated : 234 K/uL  Mean Cell Volume : 98.4 fl  Mean Cell Hemoglobin : 33.7 pg  Mean Cell Hemoglobin Concentration : 34.3 gm/dL  Auto Neutrophil # : x  Auto Lymphocyte # : x  Auto Monocyte # : x  Auto Eosinophil # : x  Auto Basophil # : x  Auto Neutrophil % : x  Auto Lymphocyte % : x  Auto Monocyte % : x  Auto Eosinophil % : x  Auto Basophil % : x    15 Oct 2023 09:30    141    |  109    |  40     ----------------------------<  136    3.5     |  23     |  1.20     Ca    8.4        15 Oct 2023 09:30        Urinalysis Basic - ( 15 Oct 2023 09:30 )    Color: x / Appearance: x / SG: x / pH: x  Gluc: 136 mg/dL / Ketone: x  / Bili: x / Urobili: x   Blood: x / Protein: x / Nitrite: x   Leuk Esterase: x / RBC: x / WBC x   Sq Epi: x / Non Sq Epi: x / Bacteria: x      CAPILLARY BLOOD GLUCOSE              RADIOLOGY & ADDITIONAL TESTS:    Personally reviewed.     Consultant(s) Notes Reviewed:  [x] YES  [ ] NO    
Patient is a 89y old  Female who presents with a chief complaint of Weakness     (14 Oct 2023 11:34)      INTERVAL HPI/OVERNIGHT EVENTS: Patient seen and examined at bedside. No overnight events. Endorses fatigue.     MEDICATIONS  (STANDING):  amLODIPine   Tablet 10 milliGRAM(s) Oral daily  bethanechol 25 milliGRAM(s) Oral two times a day  heparin   Injectable 5000 Unit(s) SubCutaneous every 8 hours  hydrALAZINE 50 milliGRAM(s) Oral three times a day  influenza  Vaccine (HIGH DOSE) 0.7 milliLiter(s) IntraMuscular once  lactulose Syrup 20 Gram(s) Oral once  levothyroxine 88 MICROGram(s) Oral daily  polyethylene glycol 3350 17 Gram(s) Oral two times a day  senna 2 Tablet(s) Oral once  senna 2 Tablet(s) Oral at bedtime  sodium chloride 0.9%. 1000 milliLiter(s) (60 mL/Hr) IV Continuous <Continuous>  zinc oxide 40% Paste 1 Application(s) Topical two times a day    MEDICATIONS  (PRN):  acetaminophen     Tablet .. 650 milliGRAM(s) Oral every 6 hours PRN Moderate Pain (4 - 6)  ibuprofen  Tablet. 400 milliGRAM(s) Oral every 8 hours PRN Mild Pain (1 - 3)  melatonin 3 milliGRAM(s) Oral at bedtime PRN Insomnia  traMADol 50 milliGRAM(s) Oral three times a day PRN Severe Pain (7 - 10)      Allergies    No Known Drug Allergies  shellfish (Unknown)    Intolerances    codeine (Nausea)      REVIEW OF SYSTEMS:  CONSTITUTIONAL: No fever or chills  HEENT:  No headache, no sore throat  RESPIRATORY: No cough, wheezing, or shortness of breath  CARDIOVASCULAR: No chest pain, palpitations  GASTROINTESTINAL: No abd pain, nausea, vomiting, or diarrhea  GENITOURINARY: No dysuria, frequency, or hematuria  NEUROLOGICAL: no focal weakness or dizziness  MUSCULOSKELETAL: no myalgias     Vital Signs Last 24 Hrs  T(C): 36.7 (14 Oct 2023 11:41), Max: 36.7 (14 Oct 2023 11:41)  T(F): 98.1 (14 Oct 2023 11:41), Max: 98.1 (14 Oct 2023 11:41)  HR: 72 (14 Oct 2023 11:41) (64 - 91)  BP: 124/60 (14 Oct 2023 11:41) (117/56 - 147/69)  BP(mean): --  RR: 17 (14 Oct 2023 11:41) (16 - 17)  SpO2: 92% (14 Oct 2023 11:41) (91% - 94%)    Parameters below as of 14 Oct 2023 11:41  Patient On (Oxygen Delivery Method): room air        PHYSICAL EXAM:  GENERAL: NAD  HEENT:  anicteric, moist mucous membranes  CHEST/LUNG:  CTA b/l, no rales, wheezes, or rhonchi  HEART:  RRR, S1, S2  ABDOMEN:  BS+, soft, nontender, nondistended  EXTREMITIES: no edema, cyanosis, or calf tenderness  NERVOUS SYSTEM: answers questions and follows commands appropriately    LABS:                        9.5    11.93 )-----------( 277      ( 14 Oct 2023 08:22 )             28.0     CBC Full  -  ( 14 Oct 2023 08:22 )  WBC Count : 11.93 K/uL  Hemoglobin : 9.5 g/dL  Hematocrit : 28.0 %  Platelet Count - Automated : 277 K/uL  Mean Cell Volume : 97.6 fl  Mean Cell Hemoglobin : 33.1 pg  Mean Cell Hemoglobin Concentration : 33.9 gm/dL  Auto Neutrophil # : x  Auto Lymphocyte # : x  Auto Monocyte # : x  Auto Eosinophil # : x  Auto Basophil # : x  Auto Neutrophil % : x  Auto Lymphocyte % : x  Auto Monocyte % : x  Auto Eosinophil % : x  Auto Basophil % : x    14 Oct 2023 08:22    139    |  107    |  35     ----------------------------<  124    3.6     |  24     |  1.20     Ca    8.6        14 Oct 2023 08:22        Urinalysis Basic - ( 14 Oct 2023 08:22 )    Color: x / Appearance: x / SG: x / pH: x  Gluc: 124 mg/dL / Ketone: x  / Bili: x / Urobili: x   Blood: x / Protein: x / Nitrite: x   Leuk Esterase: x / RBC: x / WBC x   Sq Epi: x / Non Sq Epi: x / Bacteria: x      CAPILLARY BLOOD GLUCOSE            Culture - Urine (collected 10-07-23 @ 23:03)  Source: Clean Catch Clean Catch (Midstream)  Final Report (10-09-23 @ 02:11):    No growth        RADIOLOGY & ADDITIONAL TESTS:    Personally reviewed.     Consultant(s) Notes Reviewed:  [x] YES  [ ] NO    
  HPI:  Patient is a 89y Female who presented with constipation, impaction hypothyroid and urine rentention  Patient failed voiding trial and Chase replaced.     PAST MEDICAL & SURGICAL HISTORY:  Hypothyroid      HLD (hyperlipidemia)      HTN (hypertension)      Asthma      H/O removal of cyst        FAMILY HISTORY:  No pertinent family history in first degree relatives      SOCIAL HISTORY:   Tobacco hx:  MEDICATIONS  (STANDING):  amLODIPine   Tablet 10 milliGRAM(s) Oral daily  atorvastatin 20 milliGRAM(s) Oral at bedtime  heparin   Injectable 5000 Unit(s) SubCutaneous every 8 hours  influenza  Vaccine (HIGH DOSE) 0.7 milliLiter(s) IntraMuscular once  levothyroxine 88 MICROGram(s) Oral daily  polyethylene glycol 3350 17 Gram(s) Oral two times a day  senna 2 Tablet(s) Oral at bedtime  zinc oxide 40% Paste 1 Application(s) Topical two times a day    MEDICATIONS  (PRN):  ibuprofen  Tablet. 400 milliGRAM(s) Oral every 8 hours PRN Mild Pain (1 - 3)  melatonin 3 milliGRAM(s) Oral at bedtime PRN Insomnia    Allergies    No Known Allergies    Intolerances    codeine (Nausea)      REVIEW OF SYSTEMS: Pertinent positives and negatives as stated in HPI, otherwise negative    Vital signs  T(C): 36.9 (10-11-23 @ 11:43), Max: 36.9 (10-10-23 @ 20:32)  HR: 50 (10-11-23 @ 11:43)  BP: 165/57 (10-11-23 @ 11:43)  SpO2: 94% (10-11-23 @ 11:43)  Wt(kg): --    Output      Physical Exam  Gen: NAD  Pulm: No respiratory distress, no subcostal retractions  CV: RRR, no JVD  Abd: Soft, NT, ND  : chase in place.   MSK: No edema present    LABS:        10-11 @ 10:36    WBC 7.19  / Hct 33.7  / SCr 1.20     10-11    141  |  109<H>  |  16  ----------------------------<  148<H>  3.8   |  26  |  1.20    Ca    8.6      11 Oct 2023 10:36    Culture - Urine (10.07.23 @ 23:03)    Specimen Source: Clean Catch Clean Catch (Midstream)   Culture Results:   No growth      ACC: [id number] EXAM:  CT ABDOMEN AND PELVIS   ORDERED BY: MARIYA PERSAUD     ACC: [id number] EXAM:  CT CHEST   ORDERED BY: MARIYA PERSAUD     PROCEDURE DATE:  10/07/2023          INTERPRETATION:  CLINICAL INFORMATION: Generalized weakness    COMPARISON: None.    CONTRAST/COMPLICATIONS:  IV Contrast: NONE  Oral Contrast: NONE  Complications: None reported at time of study completion    PROCEDURE:  CT of the Chest, Abdomen and Pelvis was performed.  Sagittal and coronal reformats were performed.    FINDINGS:  CHEST:  LUNGS AND LARGE AIRWAYS: Patent central airways. Dependent atelectasis.  PLEURA: Trace pleural effusions.  VESSELS: Atherosclerotic changes of the aorta and coronary arteries.  HEART: Borderline enlarged. Mitral annulus calcifications. Trace   pericardial effusion.  MEDIASTINUM AND NNAMDI: No lymphadenopathy.  CHEST WALL AND LOWER NECK: Within normal limits.    ABDOMEN AND PELVIS:  LIVER: Within normal limits.  BILE DUCTS: Normal caliber.  GALLBLADDER: Cholelithiasis.  SPLEEN: Within normal limits.  PANCREAS: Within normal limits.  ADRENALS: Within normal limits.  KIDNEYS/URETERS: No renal stones or hydronephrosis.    BLADDER: Distended  REPRODUCTIVE ORGANS: Uterus and adnexa within normal limits.    BOWEL: Sliding hiatal hernia. Diverticulosis. Moderate volume of stool   distending the rectum, with rectal wall thickening and perirectal fat   infiltration. No bowel obstruction. Appendix is normal.  PERITONEUM: No ascites.  VESSELS: Atherosclerotic changes.  RETROPERITONEUM/LYMPHNODES: No lymphadenopathy.  ABDOMINAL WALL: Tiny fat-containing umbilical hernia.  BONES: Degenerative changes.    IMPRESSION:    Moderate volume of stool distending the rectum, with rectal wall   thickening and perirectal fat infiltration, constellation of findings   suggestive of stercoral colitis.  Distended urinary bladder, may represent urinary retention.  Cholelithiasis.      I/P Weakened bladder / urine retention fecal impaction  Improved bowel regimen and deconditioning.   Recommend PT/ Sub acute rehab to help with reconditioning.  Chase to leg bag when dc for out patient voiding trial  Consider Bethanechol 25 mg BID    
Pleasant pt   Reported feeling well overall  Hard of hearing  Was awake and alert to self, time and place at time of my exam this morning  Niece and other family at bedside      MEDICATIONS  (STANDING):  acetaminophen     Tablet .. 650 milliGRAM(s) Oral every 6 hours  amLODIPine   Tablet 5 milliGRAM(s) Oral daily  atorvastatin 20 milliGRAM(s) Oral at bedtime  carvedilol 6.25 milliGRAM(s) Oral every 12 hours  heparin   Injectable 5000 Unit(s) SubCutaneous every 8 hours  influenza  Vaccine (HIGH DOSE) 0.7 milliLiter(s) IntraMuscular once  levothyroxine 88 MICROGram(s) Oral daily  polyethylene glycol 3350 17 Gram(s) Oral two times a day  senna 2 Tablet(s) Oral at bedtime  zinc oxide 40% Paste 1 Application(s) Topical two times a day    MEDICATIONS  (PRN):  melatonin 3 milliGRAM(s) Oral at bedtime PRN Insomnia      Allergies    No Known Allergies    Intolerances    codeine (Nausea)    Vital Signs Last 24 Hrs  T(C): 36.6 (10 Oct 2023 05:06), Max: 36.8 (09 Oct 2023 13:23)  T(F): 97.9 (10 Oct 2023 05:06), Max: 98.3 (09 Oct 2023 13:23)  HR: 51 (10 Oct 2023 06:33) (45 - 69)  BP: 159/65 (09 Oct 2023 22:24) (156/56 - 159/65)  BP(mean): --  RR: 17 (10 Oct 2023 05:06) (16 - 17)  SpO2: 93% (10 Oct 2023 05:06) (93% - 97%)    Parameters below as of 10 Oct 2023 05:06  Patient On (Oxygen Delivery Method): room air      I&O's Summary          PHYSICAL EXAM:  GENERAL: Elderly frail  HEENT:  AT/NC, anicteric, dry mucous membranes, EOMI, PERRL, conjunctiva and sclera clear, [place]  CHEST/LUNG:  CTA b/l, no rales, wheezes, or rhonchi,  normal respiratory effort, no intercostal retractions  HEART:  RRR, S1, S2, no murmurs; no pitting edema  ABDOMEN:  BS+, soft, nontender, nondistended  MSK/EXTREMITIES: palpable peripheral pulses, no clubbing or cyanosis  NERVOUS SYSTEM: Answering simple questions and following simple commands appropriately  PSYCH: Appropriate affect, Alert & Awake; calm and cooperative    LABS: Personally reviewed  CBC                        12.1   6.94  )-----------( 173      ( 09 Oct 2023 06:34 )             33.8     CMP  10-09    142  |  110  |  16  ----------------------------<  89  3.5   |  25  |  1.10    Ca    8.5      09 Oct 2023 06:34  Mg     2.5     10-07    TPro  6.7  /  Alb  3.2  /  TBili  0.6  /  DBili  x   /  AST  31  /  ALT  23  /  AlkPhos  66  10-08          PT/INR - ( 07 Oct 2023 17:30 )   PT: 13.0 sec;   INR: 1.11 ratio         PTT - ( 07 Oct 2023 17:30 )  PTT:34.6 sec  Lactate, Blood: 0.9 mmol/L (10-07 @ 17:30)      CARDIAC MARKERS ( 07 Oct 2023 17:30 )  x     / 6.5 ng/L / x     / x     / x            .00   TSH with FT4 reflex --  Total T3 <20                  Urinalysis Basic - ( 09 Oct 2023 06:34 )    Color: x / Appearance: x / SG: x / pH: x  Gluc: 89 mg/dL / Ketone: x  / Bili: x / Urobili: x   Blood: x / Protein: x / Nitrite: x   Leuk Esterase: x / RBC: x / WBC x   Sq Epi: x / Non Sq Epi: x / Bacteria: x        Culture - Urine (collected 07 Oct 2023 23:03)  Source: Clean Catch Clean Catch (Midstream)  Final Report (09 Oct 2023 02:11):    No growth    Culture - Blood (collected 07 Oct 2023 17:30)  Source: .Blood Blood-Peripheral  Preliminary Report (10 Oct 2023 01:02):    No growth at 48 Hours    Culture - Blood (collected 07 Oct 2023 17:25)  Source: .Blood Blood-Peripheral  Preliminary Report (10 Oct 2023 01:02):    No growth at 48 Hours            Culture - Urine (collected 10-07-23 @ 23:03)  Source: Clean Catch Clean Catch (Midstream)  Final Report (10-09-23 @ 02:11):    No growth    Culture - Blood (collected 10-07-23 @ 17:30)  Source: .Blood Blood-Peripheral  Preliminary Report (10-10-23 @ 01:02):    No growth at 48 Hours    Culture - Blood (collected 10-07-23 @ 17:25)  Source: .Blood Blood-Peripheral  Preliminary Report (10-10-23 @ 01:02):    No growth at 48 Hours        RADIOLOGY & ADDITIONAL TESTS: Personally reviewed.     Consultant(s) Notes Reviewed:  [x] YES  [ ] NO   Discussed with FLORIN/OMID, RN    
Patient is a 89y old  Female who presents with a chief complaint of weakness (09 Oct 2023 12:39)      INTERVAL HPI/OVERNIGHT EVENTS: Patient seen and examined at bedside. No overnight events.  More alert this AM  Per daughter at bedside patient had arthritic pain overnight   unable to obtain ROS due to mental status    MEDICATIONS  (STANDING):  acetaminophen   IVPB .. 650 milliGRAM(s) IV Intermittent once  acetaminophen   IVPB .. 650 milliGRAM(s) IV Intermittent once  amLODIPine   Tablet 5 milliGRAM(s) Oral daily  atorvastatin 20 milliGRAM(s) Oral at bedtime  carvedilol 6.25 milliGRAM(s) Oral every 12 hours  heparin   Injectable 5000 Unit(s) SubCutaneous every 8 hours  influenza  Vaccine (HIGH DOSE) 0.7 milliLiter(s) IntraMuscular once  levothyroxine 88 MICROGram(s) Oral daily  polyethylene glycol 3350 17 Gram(s) Oral two times a day  senna 2 Tablet(s) Oral at bedtime  zinc oxide 40% Paste 1 Application(s) Topical two times a day    MEDICATIONS  (PRN):  melatonin 3 milliGRAM(s) Oral at bedtime PRN Insomnia      Allergies    No Known Allergies    Intolerances    codeine (Nausea)    Vital Signs Last 24 Hrs  T(C): 36.8 (09 Oct 2023 05:34), Max: 37.1 (08 Oct 2023 20:39)  T(F): 98.2 (09 Oct 2023 05:34), Max: 98.8 (08 Oct 2023 20:39)  HR: 52 (09 Oct 2023 05:34) (52 - 54)  BP: 165/61 (09 Oct 2023 05:34) (145/63 - 178/54)  BP(mean): --  RR: 18 (09 Oct 2023 05:34) (18 - 18)  SpO2: 95% (09 Oct 2023 05:34) (95% - 95%)    Parameters below as of 09 Oct 2023 05:34  Patient On (Oxygen Delivery Method): room air      I&O's Summary    08 Oct 2023 07:01  -  09 Oct 2023 07:00  --------------------------------------------------------  IN: 0 mL / OUT: 1150 mL / NET: -1150 mL          PHYSICAL EXAM:  GENERAL: NAD, facial muscle atrophy noted, elderly  HEENT:  AT/NC, anicteric, dry mucous membranes, EOMI, PERRL, conjunctiva and sclera clear, [place]  CHEST/LUNG:  CTA b/l, no rales, wheezes, or rhonchi,  normal respiratory effort, no intercostal retractions  HEART:  RRR, S1, S2, no murmurs; no pitting edema  ABDOMEN:  BS+, soft, nontender, nondistended  MSK/EXTREMITIES: palpable peripheral pulses, no clubbing or cyanosis  NERVOUS SYSTEM: Alert to name, follows simple commands, limited ROM in lower extremities (chronic per daughter), grossly moves extremities  PSYCH: Appropriate affect, Alert & Awake; poor judgement      LABS: Personally reviewed  CBC                        12.1   6.94  )-----------( 173      ( 09 Oct 2023 06:34 )             33.8     CMP  10-09    142  |  110  |  16  ----------------------------<  89  3.5   |  25  |  1.10    Ca    8.5      09 Oct 2023 06:34  Mg     2.5     10-07    TPro  6.7  /  Alb  3.2  /  TBili  0.6  /  DBili  x   /  AST  31  /  ALT  23  /  AlkPhos  66  10-08          PT/INR - ( 07 Oct 2023 17:30 )   PT: 13.0 sec;   INR: 1.11 ratio         PTT - ( 07 Oct 2023 17:30 )  PTT:34.6 sec  Lactate, Blood: 0.9 mmol/L (10-07 @ 17:30)      CARDIAC MARKERS ( 07 Oct 2023 17:30 )  x     / 6.5 ng/L / x     / x     / x            .00   TSH with FT4 reflex --  Total T3 <20                  Urinalysis Basic - ( 09 Oct 2023 06:34 )    Color: x / Appearance: x / SG: x / pH: x  Gluc: 89 mg/dL / Ketone: x  / Bili: x / Urobili: x   Blood: x / Protein: x / Nitrite: x   Leuk Esterase: x / RBC: x / WBC x   Sq Epi: x / Non Sq Epi: x / Bacteria: x        Culture - Urine (collected 07 Oct 2023 23:03)  Source: Clean Catch Clean Catch (Midstream)  Final Report (09 Oct 2023 02:11):    No growth    Culture - Blood (collected 07 Oct 2023 17:30)  Source: .Blood Blood-Peripheral  Preliminary Report (09 Oct 2023 01:02):    No growth at 24 hours    Culture - Blood (collected 07 Oct 2023 17:25)  Source: .Blood Blood-Peripheral  Preliminary Report (09 Oct 2023 01:02):    No growth at 24 hours            Culture - Urine (collected 10-07-23 @ 23:03)  Source: Clean Catch Clean Catch (Midstream)  Final Report (10-09-23 @ 02:11):    No growth    Culture - Blood (collected 10-07-23 @ 17:30)  Source: .Blood Blood-Peripheral  Preliminary Report (10-09-23 @ 01:02):    No growth at 24 hours    Culture - Blood (collected 10-07-23 @ 17:25)  Source: .Blood Blood-Peripheral  Preliminary Report (10-09-23 @ 01:02):    No growth at 24 hours        RADIOLOGY & ADDITIONAL TESTS: Personally reviewed.     Consultant(s) Notes Reviewed:  [x] YES  [ ] NO   Discussed with SW/CM, RN    
Pt reports generalized body aches most pronounced in the knees bl similar to arthritic pain    MEDICATIONS  (STANDING):  acetaminophen     Tablet .. 650 milliGRAM(s) Oral every 6 hours  amLODIPine   Tablet 5 milliGRAM(s) Oral daily  atorvastatin 20 milliGRAM(s) Oral at bedtime  carvedilol 6.25 milliGRAM(s) Oral every 12 hours  heparin   Injectable 5000 Unit(s) SubCutaneous every 8 hours  influenza  Vaccine (HIGH DOSE) 0.7 milliLiter(s) IntraMuscular once  levothyroxine 88 MICROGram(s) Oral daily  polyethylene glycol 3350 17 Gram(s) Oral two times a day  senna 2 Tablet(s) Oral at bedtime  zinc oxide 40% Paste 1 Application(s) Topical two times a day    MEDICATIONS  (PRN):  melatonin 3 milliGRAM(s) Oral at bedtime PRN Insomnia      Allergies    No Known Allergies    Intolerances    codeine (Nausea)    Vital Signs Last 24 Hrs  T(C): 36.6 (10 Oct 2023 05:06), Max: 36.8 (09 Oct 2023 13:23)  T(F): 97.9 (10 Oct 2023 05:06), Max: 98.3 (09 Oct 2023 13:23)  HR: 51 (10 Oct 2023 06:33) (45 - 69)  BP: 159/65 (09 Oct 2023 22:24) (156/56 - 159/65)  BP(mean): --  RR: 17 (10 Oct 2023 05:06) (16 - 17)  SpO2: 93% (10 Oct 2023 05:06) (93% - 97%)    Parameters below as of 10 Oct 2023 05:06  Patient On (Oxygen Delivery Method): room air      I&O's Summary          PHYSICAL EXAM:  GENERAL: Elderly frail, appears weak  HEENT:  Dry oral mucosa  CHEST/LUNG:  CTA b/l, no rales, wheezes, or rhonchi,  normal respiratory effort, no intercostal retractions  HEART:  RRR, S1, S2, no murmurs; no pitting edema  ABDOMEN:  BS+, soft, nontender, nondistended  MSK/EXTREMITIES: palpable peripheral pulses, no clubbing or cyanosis. No knee swelling or overlying skin changes  LE no calf swelling, neurovascular intact  NERVOUS SYSTEM: Answering simple questions and following simple commands appropriately  PSYCH: Appropriate affect, Alert & Awake; calm and cooperative    LABS: Personally reviewed  CBC                        12.1   6.94  )-----------( 173      ( 09 Oct 2023 06:34 )             33.8     CMP  10-09    142  |  110  |  16  ----------------------------<  89  3.5   |  25  |  1.10    Ca    8.5      09 Oct 2023 06:34  Mg     2.5     10-07    TPro  6.7  /  Alb  3.2  /  TBili  0.6  /  DBili  x   /  AST  31  /  ALT  23  /  AlkPhos  66  10-08          PT/INR - ( 07 Oct 2023 17:30 )   PT: 13.0 sec;   INR: 1.11 ratio         PTT - ( 07 Oct 2023 17:30 )  PTT:34.6 sec  Lactate, Blood: 0.9 mmol/L (10-07 @ 17:30)      CARDIAC MARKERS ( 07 Oct 2023 17:30 )  x     / 6.5 ng/L / x     / x     / x            .00   TSH with FT4 reflex --  Total T3 <20                  Urinalysis Basic - ( 09 Oct 2023 06:34 )    Color: x / Appearance: x / SG: x / pH: x  Gluc: 89 mg/dL / Ketone: x  / Bili: x / Urobili: x   Blood: x / Protein: x / Nitrite: x   Leuk Esterase: x / RBC: x / WBC x   Sq Epi: x / Non Sq Epi: x / Bacteria: x        Culture - Urine (collected 07 Oct 2023 23:03)  Source: Clean Catch Clean Catch (Midstream)  Final Report (09 Oct 2023 02:11):    No growth    Culture - Blood (collected 07 Oct 2023 17:30)  Source: .Blood Blood-Peripheral  Preliminary Report (10 Oct 2023 01:02):    No growth at 48 Hours    Culture - Blood (collected 07 Oct 2023 17:25)  Source: .Blood Blood-Peripheral  Preliminary Report (10 Oct 2023 01:02):    No growth at 48 Hours            Culture - Urine (collected 10-07-23 @ 23:03)  Source: Clean Catch Clean Catch (Midstream)  Final Report (10-09-23 @ 02:11):    No growth    Culture - Blood (collected 10-07-23 @ 17:30)  Source: .Blood Blood-Peripheral  Preliminary Report (10-10-23 @ 01:02):    No growth at 48 Hours    Culture - Blood (collected 10-07-23 @ 17:25)  Source: .Blood Blood-Peripheral  Preliminary Report (10-10-23 @ 01:02):    No growth at 48 Hours        RADIOLOGY & ADDITIONAL TESTS: Personally reviewed.     Consultant(s) Notes Reviewed:  [x] YES  [ ] NO   Discussed with FLORIN/OMID, RN    
Reported mild generalized body aches  Failed voiding trial and chase catheter re-inserted yesterday  Pt declined oob due to mild generalized pain     MEDICATIONS  (STANDING):  acetaminophen     Tablet .. 650 milliGRAM(s) Oral every 6 hours  amLODIPine   Tablet 5 milliGRAM(s) Oral daily  atorvastatin 20 milliGRAM(s) Oral at bedtime  carvedilol 6.25 milliGRAM(s) Oral every 12 hours  heparin   Injectable 5000 Unit(s) SubCutaneous every 8 hours  influenza  Vaccine (HIGH DOSE) 0.7 milliLiter(s) IntraMuscular once  levothyroxine 88 MICROGram(s) Oral daily  polyethylene glycol 3350 17 Gram(s) Oral two times a day  senna 2 Tablet(s) Oral at bedtime  zinc oxide 40% Paste 1 Application(s) Topical two times a day    MEDICATIONS  (PRN):  melatonin 3 milliGRAM(s) Oral at bedtime PRN Insomnia      Allergies    No Known Allergies    Intolerances    codeine (Nausea)    Vital Signs Last 24 Hrs  T(C): 36.6 (10 Oct 2023 05:06), Max: 36.8 (09 Oct 2023 13:23)  T(F): 97.9 (10 Oct 2023 05:06), Max: 98.3 (09 Oct 2023 13:23)  HR: 51 (10 Oct 2023 06:33) (45 - 69)  BP: 159/65 (09 Oct 2023 22:24) (156/56 - 159/65)  BP(mean): --  RR: 17 (10 Oct 2023 05:06) (16 - 17)  SpO2: 93% (10 Oct 2023 05:06) (93% - 97%)    Parameters below as of 10 Oct 2023 05:06  Patient On (Oxygen Delivery Method): room air      I&O's Summary          PHYSICAL EXAM:  GENERAL: Elderly frail  HEENT:  AT/NC, anicteric, dry mucous membranes, EOMI, PERRL, conjunctiva and sclera clear, [place]  CHEST/LUNG:  CTA b/l, no rales, wheezes, or rhonchi,  normal respiratory effort, no intercostal retractions  HEART:  RRR, S1, S2, no murmurs; no pitting edema  ABDOMEN:  BS+, soft, nontender, nondistended  MSK/EXTREMITIES: palpable peripheral pulses, no clubbing or cyanosis  NERVOUS SYSTEM: Answering simple questions and following simple commands appropriately  PSYCH: Appropriate affect, Alert & Awake; calm and cooperative    LABS: Personally reviewed  CBC                        12.1   6.94  )-----------( 173      ( 09 Oct 2023 06:34 )             33.8     CMP  10-09    142  |  110  |  16  ----------------------------<  89  3.5   |  25  |  1.10    Ca    8.5      09 Oct 2023 06:34  Mg     2.5     10-07    TPro  6.7  /  Alb  3.2  /  TBili  0.6  /  DBili  x   /  AST  31  /  ALT  23  /  AlkPhos  66  10-08          PT/INR - ( 07 Oct 2023 17:30 )   PT: 13.0 sec;   INR: 1.11 ratio         PTT - ( 07 Oct 2023 17:30 )  PTT:34.6 sec  Lactate, Blood: 0.9 mmol/L (10-07 @ 17:30)      CARDIAC MARKERS ( 07 Oct 2023 17:30 )  x     / 6.5 ng/L / x     / x     / x            .00   TSH with FT4 reflex --  Total T3 <20                  Urinalysis Basic - ( 09 Oct 2023 06:34 )    Color: x / Appearance: x / SG: x / pH: x  Gluc: 89 mg/dL / Ketone: x  / Bili: x / Urobili: x   Blood: x / Protein: x / Nitrite: x   Leuk Esterase: x / RBC: x / WBC x   Sq Epi: x / Non Sq Epi: x / Bacteria: x        Culture - Urine (collected 07 Oct 2023 23:03)  Source: Clean Catch Clean Catch (Midstream)  Final Report (09 Oct 2023 02:11):    No growth    Culture - Blood (collected 07 Oct 2023 17:30)  Source: .Blood Blood-Peripheral  Preliminary Report (10 Oct 2023 01:02):    No growth at 48 Hours    Culture - Blood (collected 07 Oct 2023 17:25)  Source: .Blood Blood-Peripheral  Preliminary Report (10 Oct 2023 01:02):    No growth at 48 Hours            Culture - Urine (collected 10-07-23 @ 23:03)  Source: Clean Catch Clean Catch (Midstream)  Final Report (10-09-23 @ 02:11):    No growth    Culture - Blood (collected 10-07-23 @ 17:30)  Source: .Blood Blood-Peripheral  Preliminary Report (10-10-23 @ 01:02):    No growth at 48 Hours    Culture - Blood (collected 10-07-23 @ 17:25)  Source: .Blood Blood-Peripheral  Preliminary Report (10-10-23 @ 01:02):    No growth at 48 Hours        RADIOLOGY & ADDITIONAL TESTS: Personally reviewed.     Consultant(s) Notes Reviewed:  [x] YES  [ ] NO   Discussed with FLORIN/OMID, RN    
Patient is a 89y old  Female who presents with a chief complaint of weakness (07 Oct 2023 22:23)      INTERVAL HPI/OVERNIGHT EVENTS: Patient seen and examined at bedside. No overnight events.    MEDICATIONS  (STANDING):  amLODIPine   Tablet 5 milliGRAM(s) Oral daily  atorvastatin 20 milliGRAM(s) Oral at bedtime  carvedilol 6.25 milliGRAM(s) Oral every 12 hours  heparin   Injectable 5000 Unit(s) SubCutaneous every 8 hours  influenza  Vaccine (HIGH DOSE) 0.7 milliLiter(s) IntraMuscular once  levothyroxine 88 MICROGram(s) Oral daily  polyethylene glycol 3350 17 Gram(s) Oral two times a day  senna 2 Tablet(s) Oral at bedtime  zinc oxide 40% Paste 1 Application(s) Topical two times a day    MEDICATIONS  (PRN):  acetaminophen     Tablet .. 650 milliGRAM(s) Oral every 6 hours PRN Temp greater or equal to 38C (100.4F), Mild Pain (1 - 3)  melatonin 3 milliGRAM(s) Oral at bedtime PRN Insomnia      Allergies    No Known Allergies    Intolerances    codeine (Nausea)      REVIEW OF SYSTEMS: unable to obtain due to mental status - daughter did not want me to wake up patient    Vital Signs Last 24 Hrs  T(C): 37.1 (08 Oct 2023 20:39), Max: 37.1 (08 Oct 2023 20:39)  T(F): 98.8 (08 Oct 2023 20:39), Max: 98.8 (08 Oct 2023 20:39)  HR: 54 (08 Oct 2023 20:39) (44 - 54)  BP: 145/63 (08 Oct 2023 20:39) (145/63 - 194/64)  BP(mean): --  RR: 18 (08 Oct 2023 20:39) (15 - 18)  SpO2: 95% (08 Oct 2023 20:39) (93% - 97%)    Parameters below as of 08 Oct 2023 20:39  Patient On (Oxygen Delivery Method): room air      I&O's Summary        PHYSICAL EXAM: limited as patient did not want to participate   GENERAL: NAD  HEENT:  AT/NC, anicteric, dry mucous membranes, PERRL, conjunctiva and sclera clear, facial muscle atrophy noted  CHEST/LUNG: diminished breath sounds at bases, no wheezing, no intercostal retractions  HEART:  RRR, S1, S2, no murmurs; no pitting edema  ABDOMEN:  BS+, soft, nontender, nondistended  MSK/EXTREMITIES: palpable peripheral pulses, no clubbing or cyanosis  NERVOUS SYSTEM: alert, follows some simple commands, tired, bilateral lower extremity weakness (chronic per daughter)  PSYCH: flat affect, Alert & Awake; poor judgement      LABS: Personally reviewed  CBC                        11.5   6.37  )-----------( 185      ( 08 Oct 2023 11:20 )             33.5     CMP  10-08    143  |  111  |  15  ----------------------------<  91  3.9   |  25  |  1.10    Ca    8.7      08 Oct 2023 11:20  Mg     2.5     10-07    TPro  6.7  /  Alb  3.2  /  TBili  0.6  /  DBili  x   /  AST  31  /  ALT  23  /  AlkPhos  66  10-08          PT/INR - ( 07 Oct 2023 17:30 )   PT: 13.0 sec;   INR: 1.11 ratio         PTT - ( 07 Oct 2023 17:30 )  PTT:34.6 sec  Lactate, Blood: 0.9 mmol/L (10-07 @ 17:30)      CARDIAC MARKERS ( 07 Oct 2023 17:30 )  x     / 6.5 ng/L / x     / x     / x            .00   TSH with FT4 reflex --  Total T3 <20                  Urinalysis Basic - ( 08 Oct 2023 11:20 )    Color: x / Appearance: x / SG: x / pH: x  Gluc: 91 mg/dL / Ketone: x  / Bili: x / Urobili: x   Blood: x / Protein: x / Nitrite: x   Leuk Esterase: x / RBC: x / WBC x   Sq Epi: x / Non Sq Epi: x / Bacteria: x                RADIOLOGY & ADDITIONAL TESTS: Personally reviewed.     Consultant(s) Notes Reviewed:  [x] YES  [ ] NO   Discussed with FLORIN/OMID, RN    
Patient is a 89y old  Female who presents with a chief complaint of weakness (09 Oct 2023 13:10)      INTERVAL HPI/OVERNIGHT EVENTS: Patient seen and examined at bedside. No overnight events.  Alert to name. Unable to obtain ROS due to mental status  Daughter at bedside.   Chronic arthritic knee pain - somewhat controlled with Tylenol. Daughter declines further work up / management.     MEDICATIONS  (STANDING):  acetaminophen     Tablet .. 650 milliGRAM(s) Oral every 6 hours  amLODIPine   Tablet 5 milliGRAM(s) Oral daily  atorvastatin 20 milliGRAM(s) Oral at bedtime  carvedilol 6.25 milliGRAM(s) Oral every 12 hours  heparin   Injectable 5000 Unit(s) SubCutaneous every 8 hours  influenza  Vaccine (HIGH DOSE) 0.7 milliLiter(s) IntraMuscular once  levothyroxine 88 MICROGram(s) Oral daily  polyethylene glycol 3350 17 Gram(s) Oral two times a day  senna 2 Tablet(s) Oral at bedtime  zinc oxide 40% Paste 1 Application(s) Topical two times a day    MEDICATIONS  (PRN):  melatonin 3 milliGRAM(s) Oral at bedtime PRN Insomnia      Allergies    No Known Allergies    Intolerances    codeine (Nausea)    Vital Signs Last 24 Hrs  T(C): 36.6 (10 Oct 2023 05:06), Max: 36.8 (09 Oct 2023 13:23)  T(F): 97.9 (10 Oct 2023 05:06), Max: 98.3 (09 Oct 2023 13:23)  HR: 51 (10 Oct 2023 06:33) (45 - 69)  BP: 159/65 (09 Oct 2023 22:24) (156/56 - 159/65)  BP(mean): --  RR: 17 (10 Oct 2023 05:06) (16 - 17)  SpO2: 93% (10 Oct 2023 05:06) (93% - 97%)    Parameters below as of 10 Oct 2023 05:06  Patient On (Oxygen Delivery Method): room air      I&O's Summary          PHYSICAL EXAM:  GENERAL: NAD, facial muscle atrophy noted, elderly  HEENT:  AT/NC, anicteric, dry mucous membranes, EOMI, PERRL, conjunctiva and sclera clear, [place]  CHEST/LUNG:  CTA b/l, no rales, wheezes, or rhonchi,  normal respiratory effort, no intercostal retractions  HEART:  RRR, S1, S2, no murmurs; no pitting edema  ABDOMEN:  BS+, soft, nontender, nondistended  MSK/EXTREMITIES: palpable peripheral pulses, no clubbing or cyanosis  NERVOUS SYSTEM: Alert to name, follows simple commands, limited ROM in lower extremities (chronic per daughter), grossly moves extremities  PSYCH: Appropriate affect, Alert & Awake; poor judgement    LABS: Personally reviewed  CBC                        12.1   6.94  )-----------( 173      ( 09 Oct 2023 06:34 )             33.8     CMP  10-09    142  |  110  |  16  ----------------------------<  89  3.5   |  25  |  1.10    Ca    8.5      09 Oct 2023 06:34  Mg     2.5     10-07    TPro  6.7  /  Alb  3.2  /  TBili  0.6  /  DBili  x   /  AST  31  /  ALT  23  /  AlkPhos  66  10-08          PT/INR - ( 07 Oct 2023 17:30 )   PT: 13.0 sec;   INR: 1.11 ratio         PTT - ( 07 Oct 2023 17:30 )  PTT:34.6 sec  Lactate, Blood: 0.9 mmol/L (10-07 @ 17:30)      CARDIAC MARKERS ( 07 Oct 2023 17:30 )  x     / 6.5 ng/L / x     / x     / x            .00   TSH with FT4 reflex --  Total T3 <20                  Urinalysis Basic - ( 09 Oct 2023 06:34 )    Color: x / Appearance: x / SG: x / pH: x  Gluc: 89 mg/dL / Ketone: x  / Bili: x / Urobili: x   Blood: x / Protein: x / Nitrite: x   Leuk Esterase: x / RBC: x / WBC x   Sq Epi: x / Non Sq Epi: x / Bacteria: x        Culture - Urine (collected 07 Oct 2023 23:03)  Source: Clean Catch Clean Catch (Midstream)  Final Report (09 Oct 2023 02:11):    No growth    Culture - Blood (collected 07 Oct 2023 17:30)  Source: .Blood Blood-Peripheral  Preliminary Report (10 Oct 2023 01:02):    No growth at 48 Hours    Culture - Blood (collected 07 Oct 2023 17:25)  Source: .Blood Blood-Peripheral  Preliminary Report (10 Oct 2023 01:02):    No growth at 48 Hours            Culture - Urine (collected 10-07-23 @ 23:03)  Source: Clean Catch Clean Catch (Midstream)  Final Report (10-09-23 @ 02:11):    No growth    Culture - Blood (collected 10-07-23 @ 17:30)  Source: .Blood Blood-Peripheral  Preliminary Report (10-10-23 @ 01:02):    No growth at 48 Hours    Culture - Blood (collected 10-07-23 @ 17:25)  Source: .Blood Blood-Peripheral  Preliminary Report (10-10-23 @ 01:02):    No growth at 48 Hours        RADIOLOGY & ADDITIONAL TESTS: Personally reviewed.     Consultant(s) Notes Reviewed:  [x] YES  [ ] NO   Discussed with FLORIN/OMID, RN

## 2023-10-16 NOTE — CONSULT NOTE ADULT - ASSESSMENT
89yF PMHx of hypothyroidism, osteoarthritis, HLD, and HTN presenting with weakness, fatigue, confusion, and decreased appetite which has been progressing since Sunday. Admitted for weakness, urinary retention, and hypothyroidism ( on admission).    Plan:  - patient can be discharged, to home or hospice with chase in place  - can follow up with Dr. Sanchez 
anemia  drop in hgb    patient has been on nsaid ATC with no gi prophylaxis  start proton pump inhibitor bid  dc nsaid  reg diet as tolerated  not candidate for endoscopic eval  d/w family at bedside who agree

## 2023-10-16 NOTE — CONSULT NOTE ADULT - SUBJECTIVE AND OBJECTIVE BOX
HPI:  89yF PMHx of hypothyroidism, osteoarthritis, HLD, and HTN presenting with weakness since Sunday, Patient and patient's niece state the patient has been experiencing weakness, fatigue, confusion, and decreased appetite which has been progressing since Sunday. The patient also had difficulty standing and walking and was receiving help from a friend/neighbor. In addition, the patient had not taken most of her medications since Sunday, including levothyroxine. The only medication the patient consistently took since Sunday was carvedilol. Patient denies fever, chills, or dysuria. Patient's niece endorses that the patient had a few episodes of confusion and being "spacey" for the past few days. Patient's niece also states that she noticed vulvar/vaginal redness when changing the patient's diaper. Patient endorses chronic incontinence and knee pain. Patient also endorses constipation. Patient denies abdominal pain, nausea, vomiting, chest pain, and shortness of breath.  Of note, patient is hard of hearing.    Interval HPI:  Patient seen and examined at bedside, asleep though niece reports that she had a chase removed earlier and then did not urinate after. She usually does not ambulate to go to the bathroom and uses pull ups at home. She does not follow with a urologist. On admission patient was noted to have a distended bladder and a chase was placed. She was straight cath's twice today.     IN THE ED  Vitals: /70 | HR 51  | RR 18  | Temp 98.8F | SpO2 97% on RA   S/p: 1L NS, IVP levothyroxine 88 MCG x1, chase placement  Labs: WBC 7.92| | eGFR 41 | Cr 1.2 BUN 17| UA negative for nitrite and leukocyte esterase  Imaging  < from: CT Abdomen and Pelvis No Cont (10.07.23 @ 18:11) >  IMPRESSION:    Moderate volume of stool distending the rectum, with rectal wall   thickening and perirectal fat infiltration, constellation of findings   suggestive of stercoral colitis.  Distended urinary bladder, may represent urinary retention.  Cholelithiasis.    < end of copied text >    < from: CT Head No Cont (10.07.23 @ 18:07) >  IMPRESSION:   Mild to moderate periventricular and deep white matter   ischemia. 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    Enlarged empty sella.  Opacification of the LEFT sphenoid   sinus with mucosal thickening consistent with chronic sinusitis.    < end of copied text >   (07 Oct 2023 22:23)      PAST MEDICAL & SURGICAL HISTORY:  Hypothyroid    HLD (hyperlipidemia)    HTN (hypertension)    Asthma    H/O removal of cyst      REVIEW OF SYSTEMS  patient asleep, unable to obtain 2/2 mental status    MEDICATIONS  (STANDING):  amLODIPine   Tablet 5 milliGRAM(s) Oral daily  atorvastatin 20 milliGRAM(s) Oral at bedtime  carvedilol 6.25 milliGRAM(s) Oral every 12 hours  heparin   Injectable 5000 Unit(s) SubCutaneous every 8 hours  influenza  Vaccine (HIGH DOSE) 0.7 milliLiter(s) IntraMuscular once  levothyroxine 88 MICROGram(s) Oral daily  polyethylene glycol 3350 17 Gram(s) Oral two times a day  senna 2 Tablet(s) Oral at bedtime  zinc oxide 40% Paste 1 Application(s) Topical two times a day    MEDICATIONS  (PRN):  ibuprofen  Tablet. 400 milliGRAM(s) Oral every 8 hours PRN Mild Pain (1 - 3)  melatonin 3 milliGRAM(s) Oral at bedtime PRN Insomnia      Allergies  No Known Allergies    Intolerances  codeine (Nausea)    FAMILY HISTORY:  No pertinent family history in first degree relatives      Vital Signs Last 24 Hrs  T(C): 36.6 (10 Oct 2023 12:47), Max: 36.6 (09 Oct 2023 22:24)  T(F): 97.8 (10 Oct 2023 12:47), Max: 97.9 (09 Oct 2023 22:24)  HR: 53 (10 Oct 2023 12:47) (45 - 69)  BP: 168/63 (10 Oct 2023 12:47) (158/63 - 168/63)  BP(mean): --  RR: 17 (10 Oct 2023 12:47) (16 - 17)  SpO2: 96% (10 Oct 2023 12:47) (93% - 97%)    Parameters below as of 10 Oct 2023 12:47  Patient On (Oxygen Delivery Method): room air        PHYSICAL EXAM:  Constitutional: AAOx3, no acute distress  HEENT: NCAT, airway patent  Cardiovascular: RRR, pulses present bilaterally  Respiratory: nonlabored breathing  Gastrointestinal: abdomen soft, nontender, non distended  : chase in place with yellow urine output, non cloudy, non bloody  Neuro: no focal deficits  Extremities: non edematous, no calf pain bilaterally    LABS:                        12.1   6.94  )-----------( 173      ( 09 Oct 2023 06:34 )             33.8     10-09    142  |  110<H>  |  16  ----------------------------<  89  3.5   |  25  |  1.10    Ca    8.5      09 Oct 2023 06:34        Urinalysis Basic - ( 09 Oct 2023 06:34 )    Color: x / Appearance: x / SG: x / pH: x  Gluc: 89 mg/dL / Ketone: x  / Bili: x / Urobili: x   Blood: x / Protein: x / Nitrite: x   Leuk Esterase: x / RBC: x / WBC x   Sq Epi: x / Non Sq Epi: x / Bacteria: x  
[facility]  Ovi Mack PA-C  [address] [place] [id number] [phone]      Chief Complaint:  Patient is a 89y old  Female who presents with a chief complaint of weakness (16 Oct 2023 09:58)      HPI:89F with hypothyroidism, osteoarthritis, HLD, and HTN admitted for generalized weakness, confusion and poor oral intake found to have severe hypothyroidism  ( on admission) without myxedema coma     Allergies:  codeine (Nausea)  No Known Drug Allergies  shellfish (Unknown)      Medications:  acetaminophen     Tablet .. 650 milliGRAM(s) Oral every 6 hours PRN  albuterol/ipratropium for Nebulization 3 milliLiter(s) Nebulizer every 6 hours PRN  amLODIPine   Tablet 10 milliGRAM(s) Oral daily  bethanechol 25 milliGRAM(s) Oral two times a day  heparin   Injectable 5000 Unit(s) SubCutaneous every 8 hours  hydrALAZINE 50 milliGRAM(s) Oral three times a day  ibuprofen  Tablet. 400 milliGRAM(s) Oral every 8 hours PRN  influenza  Vaccine (HIGH DOSE) 0.7 milliLiter(s) IntraMuscular once  lactulose Syrup 20 Gram(s) Oral once  levothyroxine 88 MICROGram(s) Oral daily  melatonin 3 milliGRAM(s) Oral at bedtime PRN  polyethylene glycol 3350 17 Gram(s) Oral two times a day  senna 2 Tablet(s) Oral at bedtime  senna 2 Tablet(s) Oral once  sodium chloride 0.9%. 1000 milliLiter(s) IV Continuous <Continuous>  traMADol 50 milliGRAM(s) Oral three times a day PRN  zinc oxide 40% Paste 1 Application(s) Topical two times a day      PMHX/PSHX:  Hypothyroid    HLD (hyperlipidemia)    HTN (hypertension)    Asthma    H/O removal of cyst        Family history:  No pertinent family history in first degree relatives        Social History:     ROS:     unable to obtain      PHYSICAL EXAM:   Vital Signs:  Vital Signs Last 24 Hrs  T(C): 36.8 (16 Oct 2023 12:07), Max: 36.9 (15 Oct 2023 20:48)  T(F): 98.3 (16 Oct 2023 12:07), Max: 98.5 (15 Oct 2023 20:48)  HR: 82 (16 Oct 2023 12:07) (75 - 90)  BP: 116/59 (16 Oct 2023 12:07) (116/59 - 142/57)  BP(mean): --  RR: 17 (16 Oct 2023 12:07) (17 - 17)  SpO2: 95% (16 Oct 2023 12:07) (92% - 95%)    Parameters below as of 16 Oct 2023 12:07  Patient On (Oxygen Delivery Method): room air      Daily     Daily     nad  confused  frail  non toxic  soft, nt  no edema      LABS:                        7.5    12.62 )-----------( 245      ( 16 Oct 2023 10:06 )             22.0     10-16    141  |  110<H>  |  35<H>  ----------------------------<  115<H>  3.4<L>   |  23  |  1.00    Ca    8.1<L>      16 Oct 2023 06:45          Urinalysis Basic - ( 16 Oct 2023 06:45 )    Color: x / Appearance: x / SG: x / pH: x  Gluc: 115 mg/dL / Ketone: x  / Bili: x / Urobili: x   Blood: x / Protein: x / Nitrite: x   Leuk Esterase: x / RBC: x / WBC x   Sq Epi: x / Non Sq Epi: x / Bacteria: x          Imaging:          
  Patient is a 89y old  Female who presents with a chief complaint of weakness (08 Oct 2023 09:08)      Reason For Consult: hypothyroidism    HPI:  89yF PMHx of hypothyroidism, osteoarthritis, HLD, and HTN presenting with weakness since Sunday, Patient and patient's niece state the patient has been experiencing weakness, fatigue, confusion, and decreased appetite which has been progressing since Sunday. The patient also had difficulty standing and walking and was receiving help from a friend/neighbor. In addition, the patient had not taken most of her medications since Sunday, including levothyroxine. The only medication the patient consistently took since Sunday was carvedilol. Patient denies fever, chills, or dysuria. Patient's niece endorses that the patient had a few episodes of confusion and being "spacey" for the past few days. Patient's niece also states that she noticed vulvar/vaginal redness when changing the patient's diaper. Patient endorses chronic incontinence and knee pain. Patient also endorses constipation. Patient denies abdominal pain, nausea, vomiting, chest pain, and shortness of breath.  Of note, patient is hard of hearing.    IN THE ED  Vitals: /70 | HR 51  | RR 18  | Temp 98.8F | SpO2 97% on RA   S/p: 1L NS, IVP levothyroxine 88 MCG x1, chase placement  Labs: WBC 7.92| | eGFR 41 | Cr 1.2 BUN 17| UA negative for nitrite and leukocyte esterase  Imaging  < from: CT Abdomen and Pelvis No Cont (10.07.23 @ 18:11) >  IMPRESSION:    Moderate volume of stool distending the rectum, with rectal wall   thickening and perirectal fat infiltration, constellation of findings   suggestive of stercoral colitis.  Distended urinary bladder, may represent urinary retention.  Cholelithiasis.    < end of copied text >    < from: CT Head No Cont (10.07.23 @ 18:07) >  IMPRESSION:   Mild to moderate periventricular and deep white matter   ischemia. 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    Enlarged empty sella.  Opacification of the LEFT sphenoid   sinus with mucosal thickening consistent with chronic sinusitis.    < end of copied text >   (07 Oct 2023 22:23)      PAST MEDICAL & SURGICAL HISTORY:  Hypothyroid      HLD (hyperlipidemia)      HTN (hypertension)      Asthma      H/O removal of cyst          FAMILY HISTORY:  No pertinent family history in first degree relatives          Social History:    MEDICATIONS  (STANDING):  amLODIPine   Tablet 5 milliGRAM(s) Oral daily  atorvastatin 20 milliGRAM(s) Oral at bedtime  carvedilol 6.25 milliGRAM(s) Oral every 12 hours  heparin   Injectable 5000 Unit(s) SubCutaneous every 8 hours  influenza  Vaccine (HIGH DOSE) 0.7 milliLiter(s) IntraMuscular once  levothyroxine 88 MICROGram(s) Oral daily  polyethylene glycol 3350 17 Gram(s) Oral two times a day  senna 2 Tablet(s) Oral at bedtime  zinc oxide 40% Paste 1 Application(s) Topical two times a day    MEDICATIONS  (PRN):  acetaminophen     Tablet .. 650 milliGRAM(s) Oral every 6 hours PRN Temp greater or equal to 38C (100.4F), Mild Pain (1 - 3)  melatonin 3 milliGRAM(s) Oral at bedtime PRN Insomnia        T(C): 37.1 (10-08-23 @ 20:39), Max: 37.1 (10-08-23 @ 20:39)  HR: 54 (10-08-23 @ 20:39) (44 - 54)  BP: 145/63 (10-08-23 @ 20:39) (145/63 - 194/64)  RR: 18 (10-08-23 @ 20:39) (15 - 18)  SpO2: 95% (10-08-23 @ 20:39) (93% - 97%)  Wt(kg): --    PHYSICAL EXAM:  GENERAL: NAD, well-groomed, well-developed  HEAD:  Atraumatic, Normocephalic  NECK: Supple, No JVD, Normal thyroid  CHEST/LUNG: Clear to percussion bilaterally; No rales, rhonchi, wheezing, or rubs  HEART: Regular rate and rhythm; No murmurs, rubs, or gallops  ABDOMEN: Soft, Nontender, Nondistended; Bowel sounds present  EXTREMITIES:  2+ Peripheral Pulses, No clubbing, cyanosis, or edema  SKIN: No rashes or lesions    CAPILLARY BLOOD GLUCOSE                                11.5   6.37  )-----------( 185      ( 08 Oct 2023 11:20 )             33.5       CMP:  10-08 @ 11:20  SGPT 23  Albumin 3.2   Alk Phos 66   Anion Gap 7   SGOT 31   Total Bili 0.6   BUN 15   Calcium Total 8.7   CO2 25   Chloride 111   Creatinine 1.10   eGFR if AA --   eGFR if non AA --   Glucose 91   Potassium 3.9   Protein 6.7   Sodium 143      Thyroid Function Tests:  10-07 @ 17:30 .00 FreeT4 -- T3 <20 Anti TPO -- Anti Thyroglobulin Ab -- TSI --      Diabetes Tests:       Radiology:

## 2023-12-13 PROCEDURE — 74176 CT ABD & PELVIS W/O CONTRAST: CPT | Mod: MA

## 2023-12-13 PROCEDURE — 36415 COLL VENOUS BLD VENIPUNCTURE: CPT

## 2023-12-13 PROCEDURE — 94640 AIRWAY INHALATION TREATMENT: CPT

## 2023-12-13 PROCEDURE — 85610 PROTHROMBIN TIME: CPT

## 2023-12-13 PROCEDURE — 93005 ELECTROCARDIOGRAM TRACING: CPT

## 2023-12-13 PROCEDURE — 71250 CT THORAX DX C-: CPT | Mod: MA

## 2023-12-13 PROCEDURE — 83735 ASSAY OF MAGNESIUM: CPT

## 2023-12-13 PROCEDURE — 71045 X-RAY EXAM CHEST 1 VIEW: CPT

## 2023-12-13 PROCEDURE — 70450 CT HEAD/BRAIN W/O DYE: CPT | Mod: MA

## 2023-12-13 PROCEDURE — 85027 COMPLETE CBC AUTOMATED: CPT

## 2023-12-13 PROCEDURE — 83605 ASSAY OF LACTIC ACID: CPT

## 2023-12-13 PROCEDURE — 80048 BASIC METABOLIC PNL TOTAL CA: CPT

## 2023-12-13 PROCEDURE — 87040 BLOOD CULTURE FOR BACTERIA: CPT

## 2023-12-13 PROCEDURE — 94760 N-INVAS EAR/PLS OXIMETRY 1: CPT

## 2023-12-13 PROCEDURE — 84484 ASSAY OF TROPONIN QUANT: CPT

## 2023-12-13 PROCEDURE — 84480 ASSAY TRIIODOTHYRONINE (T3): CPT

## 2023-12-13 PROCEDURE — 87086 URINE CULTURE/COLONY COUNT: CPT

## 2023-12-13 PROCEDURE — 87637 SARSCOV2&INF A&B&RSV AMP PRB: CPT

## 2023-12-13 PROCEDURE — 97162 PT EVAL MOD COMPLEX 30 MIN: CPT

## 2023-12-13 PROCEDURE — 81003 URINALYSIS AUTO W/O SCOPE: CPT

## 2023-12-13 PROCEDURE — 84436 ASSAY OF TOTAL THYROXINE: CPT

## 2023-12-13 PROCEDURE — 85730 THROMBOPLASTIN TIME PARTIAL: CPT

## 2023-12-13 PROCEDURE — 80053 COMPREHEN METABOLIC PANEL: CPT

## 2023-12-13 PROCEDURE — 96360 HYDRATION IV INFUSION INIT: CPT

## 2023-12-13 PROCEDURE — 99285 EMERGENCY DEPT VISIT HI MDM: CPT

## 2023-12-13 PROCEDURE — 84443 ASSAY THYROID STIM HORMONE: CPT

## 2023-12-13 PROCEDURE — 85025 COMPLETE CBC W/AUTO DIFF WBC: CPT
